# Patient Record
Sex: FEMALE | Race: WHITE | NOT HISPANIC OR LATINO | URBAN - METROPOLITAN AREA
[De-identification: names, ages, dates, MRNs, and addresses within clinical notes are randomized per-mention and may not be internally consistent; named-entity substitution may affect disease eponyms.]

---

## 2019-07-26 RX ORDER — LANOLIN ALCOHOL/MO/W.PET/CERES
1 CREAM (GRAM) TOPICAL DAILY
COMMUNITY

## 2019-07-26 RX ORDER — IBUPROFEN 200 MG
2 TABLET ORAL EVERY 6 HOURS PRN
COMMUNITY
End: 2019-07-29

## 2019-07-26 RX ORDER — MELOXICAM 15 MG/1
1 TABLET ORAL DAILY
COMMUNITY
End: 2019-07-29 | Stop reason: SDUPTHER

## 2019-07-29 ENCOUNTER — OFFICE VISIT (OUTPATIENT)
Dept: FAMILY MEDICINE CLINIC | Facility: CLINIC | Age: 54
End: 2019-07-29

## 2019-07-29 VITALS
DIASTOLIC BLOOD PRESSURE: 73 MMHG | HEART RATE: 70 BPM | SYSTOLIC BLOOD PRESSURE: 124 MMHG | OXYGEN SATURATION: 100 % | HEIGHT: 62 IN | TEMPERATURE: 98.7 F | WEIGHT: 120 LBS | BODY MASS INDEX: 22.08 KG/M2 | RESPIRATION RATE: 18 BRPM

## 2019-07-29 DIAGNOSIS — G89.29 CHRONIC BILATERAL LOW BACK PAIN WITHOUT SCIATICA: ICD-10-CM

## 2019-07-29 DIAGNOSIS — M54.50 CHRONIC BILATERAL LOW BACK PAIN WITHOUT SCIATICA: ICD-10-CM

## 2019-07-29 DIAGNOSIS — M25.562 ACUTE PAIN OF LEFT KNEE: Primary | ICD-10-CM

## 2019-07-29 DIAGNOSIS — Z98.890 HISTORY OF LUMBAR DISCECTOMY: ICD-10-CM

## 2019-07-29 DIAGNOSIS — M17.12 ARTHRITIS OF KNEE, LEFT: ICD-10-CM

## 2019-07-29 DIAGNOSIS — M51.36 DDD (DEGENERATIVE DISC DISEASE), LUMBAR: ICD-10-CM

## 2019-07-29 PROCEDURE — 99214 OFFICE O/P EST MOD 30 MIN: CPT | Performed by: FAMILY MEDICINE

## 2019-07-29 RX ORDER — MELOXICAM 15 MG/1
15 TABLET ORAL DAILY
Qty: 90 TABLET | Refills: 1 | Status: SHIPPED | OUTPATIENT
Start: 2019-07-29 | End: 2020-02-07 | Stop reason: SDUPTHER

## 2019-07-29 NOTE — PATIENT INSTRUCTIONS
Core Strength Exercises  Core exercises help to build strength in the muscles between your ribs and your hips (abdominal muscles). These muscles help to support your body and keep your spine stable. It is important to maintain strength in your core to prevent injury and pain.  Some activities, such as yoga and Pilates, can help to strengthen core muscles. You can also strengthen core muscles with exercises at home. It is important to talk to your health care provider before you start a new exercise routine.  What are the benefits of core strength exercises?  Core strength exercises can:  · Reduce back pain.  · Help to rebuild strength after a back or spine injury.  · Help to prevent injury during physical activity, especially injuries to the back and knees.    How to do core strength exercises  Repeat these exercises 10-15 times, or until you are tired. Do exercises exactly as told by your health care provider and adjust them as directed. It is normal to feel mild stretching, pulling, tightness, or discomfort as you do these exercises. If you feel any pain while doing these exercises, stop. If your pain continues or gets worse when doing core exercises, contact your health care provider.  You may want to use a padded yoga or exercise mat for strength exercises that are done on the floor.  Bridging  1. Lie on your back on a firm surface with your knees bent and your feet flat on the floor.  2. Raise your hips so that your knees, hips, and shoulders form a straight line together. Keep your abdominal muscles tight.  3. Hold this position for 3-5 seconds.  4. Slowly lower your hips to the starting position.  5. Let your muscles relax completely between repetitions.  Single-leg bridge  1. Lie on your back on a firm surface with your knees bent and your feet flat on the floor.  2. Raise your hips so that your knees, hips, and shoulders form a straight line together. Keep your abdominal muscles tight.  3. Lift one foot off  the floor, then completely straighten that leg.  4. Hold this position for 3-5 seconds.  5. Put the straight leg back down in the bent position.  6. Slowly lower your hips to the starting position.  7. Repeat these steps using your other leg.  Side bridge  1. Lie on your side with your knees bent. Prop yourself up on the elbow that is near the floor.  2. Using your abdominal muscles and your elbow that is on the floor, raise your body off the floor. Raise your hip so that your shoulder, hip, and foot form a straight line together.  3. Hold this position for 10 seconds. Keep your head and neck raised and away from your shoulder (in their normal, neutral position). Keep your abdominal muscles tight.  4. Slowly lower your hip to the starting position.  5. Repeat and try to hold this position longer, working your way up to 30 seconds.  Abdominal crunch  1. Lie on your back on a firm surface. Bend your knees and keep your feet flat on the floor.  2. Cross your arms over your chest.  3. Without bending your neck, tip your chin slightly toward your chest.  4. Tighten your abdominal muscles as you lift your chest just high enough to lift your shoulder blades off of the floor. Do not hold your breath. You can do this with short lifts or long lifts.  5. Slowly return to the starting position.  Bird dog  1. Get on your hands and knees, with your legs shoulder-width apart and your arms under your shoulders. Keep your back straight.  2. Tighten your abdominal muscles.  3. Raise one of your legs off the floor and straighten it. Try to keep it parallel to the floor.  4. Slowly lower your leg to the starting position.  5. Raise one of your arms off the floor and straighten it. Try to keep it parallel to the floor.  6. Slowly lower your arm to the starting position.  7. Repeat with the other arm and leg. If possible, try raising a leg and arm at the same time, on opposite sides of the body. For example, raise your left hand and your  right leg.  Plank  1. Lie on your belly.  2. Prop up your body onto your forearms and your feet, keeping your legs straight. Your body should make a straight line between your shoulders and feet.  3. Hold this position for 10 seconds while keeping your abdominal muscles tight.  4. Lower your body to the starting position.  5. Repeat and try to hold this position longer, working your way up to 30 seconds.  Cross-core strengthening  1. Stand with your feet shoulder-width apart.  2. Hold a ball out in front of you. Keep your arms straight.  3. Tighten your abdominal muscles and slowly rotate at your waist from side to side. Keep your feet flat.  4. Once you are comfortable, try repeating this exercise with a heavier ball.  Top core strengthening  1. Stand about 18 inches (46 cm) in front of a wall, with your back to the wall.  2. Keep your feet flat and shoulder-width apart.  3. Tighten your abdominal muscles.  4. Bend your hips and knees.  5. Slowly reach between your legs to touch the wall behind you.  6. Slowly stand back up.  7. Raise your arms over your head and reach behind you.  8. Return to the starting position.  General tips  · Do not do any exercises that cause pain. If you have pain while exercising, talk to your health care provider.  · Always stretch before and after doing these exercises. This can help prevent injury.  · Maintain a healthy weight. Ask your health care provider what weight is healthy for you.  Contact a health care provider if:  · You have back pain that gets worse or does not go away.  · You feel pain while doing core strength exercises.  Get help right away if:  · You have severe pain that does not get better with medicine.  Summary  · Core exercises help to build strength in the muscles between your ribs and your waist.  · Core muscles help to support your body and keep your spine stable.  · Some activities, such as yoga and Pilates, can help to strengthen core muscles.  · Core  strength exercises can help back pain and can prevent injury.  · If you feel any pain while doing core strength exercises, stop.  This information is not intended to replace advice given to you by your health care provider. Make sure you discuss any questions you have with your health care provider.  Document Released: 2018 Document Revised: 2018 Document Reviewed: 2018  [employer] Interactive Patient Education © 2019 [employer]    Back Exercises  The following exercises strengthen the muscles that help to support the back. They also help to keep the lower back flexible. Doing these exercises can help to prevent back pain or lessen existing pain.  If you have back pain or discomfort, try doing these exercises 2-3 times each day or as told by your health care provider. When the pain goes away, do them once each day, but increase the number of times that you repeat the steps for each exercise (do more repetitions). If you do not have back pain or discomfort, do these exercises once each day or as told by your health care provider.  Exercises  Single Knee to Chest  Repeat these steps 3-5 times for each le. Lie on your back on a firm bed or the floor with your legs extended.  2. Bring one knee to your chest. Your other leg should stay extended and in contact with the floor.  3. Hold your knee in place by grabbing your knee or thigh.  4. Pull on your knee until you feel a gentle stretch in your lower back.  5. Hold the stretch for 10-30 seconds.  6. Slowly release and straighten your leg.    Pelvic Tilt  Repeat these steps 5-10 times:  1. Lie on your back on a firm bed or the floor with your legs extended.  2. Bend your knees so they are pointing toward the ceiling and your feet are flat on the floor.  3. Tighten your lower abdominal muscles to press your lower back against the floor. This motion will tilt your pelvis so your tailbone points up toward the ceiling instead of pointing to your feet  or the floor.  4. With gentle tension and even breathing, hold this position for 5-10 seconds.    Cat-Cow    Repeat these steps until your lower back becomes more flexible:  1. Get into a hands-and-knees position on a firm surface. Keep your hands under your shoulders, and keep your knees under your hips. You may place padding under your knees for comfort.  2. Let your head hang down, and point your tailbone toward the floor so your lower back becomes rounded like the back of a cat.  3. Hold this position for 5 seconds.  4. Slowly lift your head and point your tailbone up toward the ceiling so your back forms a sagging arch like the back of a cow.  5. Hold this position for 5 seconds.    Press-Ups    Repeat these steps 5-10 times:  1. Lie on your abdomen (face-down) on the floor.  2. Place your palms near your head, about shoulder-width apart.  3. While you keep your back as relaxed as possible and keep your hips on the floor, slowly straighten your arms to raise the top half of your body and lift your shoulders. Do not use your back muscles to raise your upper torso. You may adjust the placement of your hands to make yourself more comfortable.  4. Hold this position for 5 seconds while you keep your back relaxed.  5. Slowly return to lying flat on the floor.    Bridges    Repeat these steps 10 times:  1. Lie on your back on a firm surface.  2. Bend your knees so they are pointing toward the ceiling and your feet are flat on the floor.  3. Tighten your buttocks muscles and lift your buttocks off of the floor until your waist is at almost the same height as your knees. You should feel the muscles working in your buttocks and the back of your thighs. If you do not feel these muscles, slide your feet 1-2 inches farther away from your buttocks.  4. Hold this position for 3-5 seconds.  5. Slowly lower your hips to the starting position, and allow your buttocks muscles to relax completely.    If this exercise is too easy,  try doing it with your arms crossed over your chest.  Abdominal Crunches  Repeat these steps 5-10 times:  1. Lie on your back on a firm bed or the floor with your legs extended.  2. Bend your knees so they are pointing toward the ceiling and your feet are flat on the floor.  3. Cross your arms over your chest.  4. Tip your chin slightly toward your chest without bending your neck.  5. Tighten your abdominal muscles and slowly raise your trunk (torso) high enough to lift your shoulder blades a tiny bit off of the floor. Avoid raising your torso higher than that, because it can put too much stress on your low back and it does not help to strengthen your abdominal muscles.  6. Slowly return to your starting position.    Back Lifts  Repeat these steps 5-10 times:  1. Lie on your abdomen (face-down) with your arms at your sides, and rest your forehead on the floor.  2. Tighten the muscles in your legs and your buttocks.  3. Slowly lift your chest off of the floor while you keep your hips pressed to the floor. Keep the back of your head in line with the curve in your back. Your eyes should be looking at the floor.  4. Hold this position for 3-5 seconds.  5. Slowly return to your starting position.    Contact a health care provider if:  · Your back pain or discomfort gets much worse when you do an exercise.  · Your back pain or discomfort does not lessen within 2 hours after you exercise.  If you have any of these problems, stop doing these exercises right away. Do not do them again unless your health care provider says that you can.  Get help right away if:  · You develop sudden, severe back pain. If this happens, stop doing the exercises right away. Do not do them again unless your health care provider says that you can.  This information is not intended to replace advice given to you by your health care provider. Make sure you discuss any questions you have with your health care provider.  Document Released: 01/25/2006  Document Revised: 07/31/2018 Document Reviewed: 02/11/2016  [email or web address] Interactive Patient Education © 2019 [employer]
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
## 2020-02-07 DIAGNOSIS — G89.29 CHRONIC BILATERAL LOW BACK PAIN WITHOUT SCIATICA: ICD-10-CM

## 2020-02-07 DIAGNOSIS — M54.50 CHRONIC BILATERAL LOW BACK PAIN WITHOUT SCIATICA: ICD-10-CM

## 2020-02-07 DIAGNOSIS — M51.36 DDD (DEGENERATIVE DISC DISEASE), LUMBAR: ICD-10-CM

## 2020-02-07 RX ORDER — MELOXICAM 15 MG/1
15 TABLET ORAL DAILY
Qty: 90 TABLET | Refills: 1 | Status: SHIPPED | OUTPATIENT
Start: 2020-02-07 | End: 2020-07-20 | Stop reason: SDUPTHER

## 2020-02-07 NOTE — TELEPHONE ENCOUNTER
TC from patient requesting refill be sent to [facility] for the following med;    Meloxicam 15mg  #90

## 2020-07-20 DIAGNOSIS — M54.50 CHRONIC BILATERAL LOW BACK PAIN WITHOUT SCIATICA: ICD-10-CM

## 2020-07-20 DIAGNOSIS — M51.36 DDD (DEGENERATIVE DISC DISEASE), LUMBAR: ICD-10-CM

## 2020-07-20 DIAGNOSIS — G89.29 CHRONIC BILATERAL LOW BACK PAIN WITHOUT SCIATICA: ICD-10-CM

## 2020-07-20 RX ORDER — MELOXICAM 15 MG/1
15 TABLET ORAL DAILY
Qty: 90 TABLET | Refills: 1 | Status: SHIPPED | OUTPATIENT
Start: 2020-07-20 | End: 2020-11-23 | Stop reason: SDUPTHER

## 2020-11-23 ENCOUNTER — OFFICE VISIT (OUTPATIENT)
Dept: FAMILY MEDICINE CLINIC | Facility: CLINIC | Age: 55
End: 2020-11-23

## 2020-11-23 VITALS
OXYGEN SATURATION: 97 % | SYSTOLIC BLOOD PRESSURE: 139 MMHG | TEMPERATURE: 97.1 F | HEART RATE: 86 BPM | WEIGHT: 120 LBS | BODY MASS INDEX: 20.49 KG/M2 | DIASTOLIC BLOOD PRESSURE: 89 MMHG | HEIGHT: 64 IN

## 2020-11-23 DIAGNOSIS — M54.50 CHRONIC BILATERAL LOW BACK PAIN WITHOUT SCIATICA: ICD-10-CM

## 2020-11-23 DIAGNOSIS — L29.0 PRURITUS ANI: Primary | ICD-10-CM

## 2020-11-23 DIAGNOSIS — M51.36 DDD (DEGENERATIVE DISC DISEASE), LUMBAR: ICD-10-CM

## 2020-11-23 DIAGNOSIS — G89.29 CHRONIC BILATERAL LOW BACK PAIN WITHOUT SCIATICA: ICD-10-CM

## 2020-11-23 DIAGNOSIS — R19.5 HEME POSITIVE STOOL: ICD-10-CM

## 2020-11-23 LAB
DEVELOPER EXPIRATION DATE: ABNORMAL
DEVELOPER LOT NUMBER: ABNORMAL
EXPIRATION DATE: ABNORMAL
FECAL OCCULT BLOOD SCREEN, POC: POSITIVE
Lab: ABNORMAL
NEGATIVE CONTROL: NEGATIVE
POSITIVE CONTROL: POSITIVE

## 2020-11-23 PROCEDURE — 99214 OFFICE O/P EST MOD 30 MIN: CPT | Performed by: FAMILY MEDICINE

## 2020-11-23 PROCEDURE — 82270 OCCULT BLOOD FECES: CPT | Performed by: FAMILY MEDICINE

## 2020-11-23 RX ORDER — HYDROCORTISONE 25 MG/G
CREAM TOPICAL 2 TIMES DAILY
Qty: 28 G | Refills: 3 | Status: SHIPPED | OUTPATIENT
Start: 2020-11-23 | End: 2022-03-08

## 2020-11-23 RX ORDER — MELOXICAM 15 MG/1
15 TABLET ORAL DAILY
Qty: 90 TABLET | Refills: 3 | Status: SHIPPED | OUTPATIENT
Start: 2020-11-23 | End: 2021-10-21 | Stop reason: SDUPTHER

## 2020-11-23 NOTE — PROGRESS NOTES
No chief complaint on file.      Subjective     Juanita Chacon is a 55 y.o. female.  Patient here today complaining of having itching in her butt, states this has been an on going issue for years, patient has tried different things, like changing her under wear, different body washes, has been given cream through urgent care at [facility], treated with anihelmethic, have tried antifungals and cortisone creams, nothing helps for long, comes and goes.     Had mammogram and pap about 5 years ago. Had to follow with additonal mammograms huge cost.  Have not had another mammogram since.    Patient is requesting refill of meloxicam for chronic back pain.    Patient was concerned about elevated blood pressure, states she does check occasionally at home and has never had readings anywhere near as high as today.    The following portions of the patient's history were reviewed and updated as appropriate: allergies, current medications, past family history, past medical history, past social history, past surgical history and problem list.    Current Outpatient Medications on File Prior to Visit   Medication Sig   • Garlic 500 MG capsule Take 1 capsule by mouth Daily.   • Multiple Vitamins-Minerals (CENTRUM SILVER 50+WOMEN PO) Take 1 tablet by mouth Daily.   • VITAMIN B COMPLEX-C PO Take 1 capsule by mouth Daily.   • [DISCONTINUED] meloxicam (MOBIC) 15 MG tablet Take 1 tablet by mouth Daily.     No current facility-administered medications on file prior to visit.      Medications Discontinued During This Encounter   Medication Reason   • meloxicam (MOBIC) 15 MG tablet Reorder       Review of Systems   Constitutional: Positive for diaphoresis. Negative for fatigue and fever.   Respiratory: Negative for cough and shortness of breath.    Cardiovascular: Negative for chest pain, palpitations and leg swelling.   Gastrointestinal: Negative.    Endocrine: Positive for heat intolerance.   Genitourinary: Positive for menstrual  "problem ( Approximately 3 periods a year) and vaginal discharge.   Musculoskeletal: Positive for back pain. Negative for joint swelling.   Skin: Negative for rash.        Pruritus   Psychiatric/Behavioral: Negative for depressed mood.        Objective   Vitals:    11/23/20 1523 11/23/20 1531   BP: 146/95 139/89   BP Location: Right arm Right arm   Patient Position: Sitting Sitting   Cuff Size: Adult Adult   Pulse: 86    Temp: 97.1 °F (36.2 °C)    TempSrc: Infrared    SpO2: 97%    Weight: 54.4 kg (120 lb)    Height: 162.6 cm (64\")       Body mass index is 20.6 kg/m².    Physical Exam  Vitals signs and nursing note reviewed.   Constitutional:       General: She is not in acute distress.     Appearance: Normal appearance. She is well-developed.   HENT:      Head: Normocephalic and atraumatic.   Cardiovascular:      Rate and Rhythm: Normal rate and regular rhythm.      Heart sounds: No murmur.   Pulmonary:      Effort: Pulmonary effort is normal.      Breath sounds: Normal breath sounds. No wheezing.   Genitourinary:     Rectum: Normal. Guaiac result positive.      Comments: Sheryl anal area with slightly hypertrophic minimally hyperpigmented skin and then between 1 and 2 cm circumferentially, there is a subcentimeter somewhat lacy minimally raised patch at the 10 to 11 o'clock position.  There is no evidence of skin fissures, external or internal hemorrhoids.  Anoscope was not available for better visualization  Musculoskeletal: Normal range of motion.   Skin:     General: Skin is warm and dry.      Findings: No rash.   Neurological:      Mental Status: She is alert and oriented to person, place, and time.             No results found for: HGBA1C     Procedures     Assessment/Plan   Diagnoses and all orders for this visit:    1. Pruritus ani (Primary)  -     Hydrocortisone, Perianal, (ANUSOL-HC) 2.5 % rectal cream; Insert  into the rectum 2 (Two) Times a Day.  Dispense: 28 g; Refill: 3    2. Chronic bilateral low back " pain without sciatica  -     meloxicam (MOBIC) 15 MG tablet; Take 1 tablet by mouth Daily.  Dispense: 90 tablet; Refill: 3    3. DDD (degenerative disc disease), lumbar  -     meloxicam (MOBIC) 15 MG tablet; Take 1 tablet by mouth Daily.  Dispense: 90 tablet; Refill: 3    4. Heme positive stool  -     POCT occult blood x 1 stool    Pruritus ani with pain hem positivity, prescription for hydrocortisone and  referral for GI consultation and colonoscopy will have patient return for gynecologic exam and Pap and further evaluation of dermatologic changes possibly consistent with lichen sclerosis in the perianal/perineal area.  Elevated blood pressure without history of hypertension, recheck in prehypertensive range, likely whitecoat hypertension  Refill meloxicam,  Medications Discontinued During This Encounter   Medication Reason   • meloxicam (MOBIC) 15 MG tablet Reorder          Return in about 4 weeks (around 12/21/2020) for wellness with pap and follow up on puritis ani and recheck blood pressure.    Education reference material relevant to visit available in AVS through [facility] or printed copy given.

## 2020-11-23 NOTE — PATIENT INSTRUCTIONS
Anal Pruritus  Anal pruritus is an itchy feeling in the anus and on the skin around the anus. This is common and can be caused by many things. It often occurs when the area becomes moist. Moisture may be due to sweating or to a small amount of stool (feces) that is left on the area because of poor personal cleaning. Some other causes include:  · Things that can irritate your skin, such as:  ? Perfumed soaps and sprays.  ? Colored or scented toilet paper.  ? Excessive washing.  · Certain foods, such as caffeine, beer, and spicy foods.  · Diarrhea or loose stool.  · Skin disorders (psoriasis, eczema, or seborrhea).  · Hemorrhoids, fissures, infections, and other anal diseases.  · Other medical conditions, such as diabetes, thyroid problems, STIs (sexually transmitted infections), or some cancers.  In many cases, the cause is not known. The itching usually goes away with treatment and home care. Scratching can cause further skin damage and make the problem worse.  Follow these instructions at home:  Skin care         · Practice good hygiene.  ? Clean the anal area gently with wet toilet paper or a wet washcloth after every bowel movement and at bedtime.  ? Avoid using soaps on the anal area.  ? Dry the area thoroughly. Pat the area dry with toilet paper or a towel.  · Do not scrub the anal area with anything, including toilet paper.  · Do not scratch the itchy area. Scratching causes more damage and makes the itching worse.  · Take sitz baths as told by your health care provider.  ? A sitz bath is a warm water bath that only comes up to your hips and covers your buttocks. A sitz bath may be done at home in a bathtub or with a portable sitz bath that fits over the toilet.  ? Pat the area dry with a soft cloth after each bath.  · Use creams or ointments as told by your health care provider. Zinc oxide ointment or a moisture barrier cream can be applied several times a day to protect and heal the skin.  · Do not use  anything that irritates the skin, such as bubble baths, scented toilet paper, or genital deodorants.  General instructions  · Pay attention to any changes in your symptoms.  · Take or apply over-the-counter and prescription medicines only as told by your health care provider.  · Avoid overusing medicines that help you have a bowel movement (laxatives). These can cause you to have loose stools.  · Talk with your health care provider about whether you should increase the fiber in your diet. This can help keep your stool normal if you have frequent loose stools.  · Limit or avoid foods that may cause your symptoms. These may include:  ? Spicy foods, such as salsa, jalapeño peppers, and spicy seasonings.  ? Caffeine or beer.  ? Milk products.  ? Chocolate, nuts, citrus fruits, or tomatoes.  · Wear cotton underwear and loose clothing.  · Keep all follow-up visits as told by your health care provider. This is important.  Contact a health care provider if:  · Your itching does not improve in several days.  · Your itching gets worse.  · You have a fever.  · You have redness, swelling, or pain in the anal area.  · You have fluid, blood, or pus coming from the anal area.  Summary  · Anal pruritus is an itchy feeling in the anus and the skin in the anal area. This can be caused by many things, such as things that irritate your skin and certain medical conditions.  · Take or apply over-the-counter and prescription medicines only as told by your health care provider.  · Practice good hygiene as told by your health care provider.  · Talk with your health care provider about fiber supplements. These are helpful in keeping your stool normal if you have frequent loose stools.  · Contact a health care provider if your symptoms get worse or if you develop new symptoms.  This information is not intended to replace advice given to you by your health care provider. Make sure you discuss any questions you have with your health care  provider.  Document Released: 06/18/2012 Document Revised: 04/29/2019 Document Reviewed: 04/29/2019  [employer]vier Patient Education © 2020 [employer]    Lichen Sclerosus  Lichen sclerosus is a skin problem. It can happen on any part of the body, but it commonly involves the anal or genital areas. It can cause itching and discomfort in these areas. Treatment can help to control symptoms. When the genital area is affected, getting treatment is important because the condition can cause scarring that may lead to other problems.  What are the causes?  The cause of this condition is not known. It may be related to an overactive immune system or a lack of certain hormones. Lichen sclerosus is not an infection or a fungus, and it is not passed from one person to another (not contagious).  What increases the risk?  This condition is more likely to develop in women, usually after menopause.  What are the signs or symptoms?  Symptoms of this condition include:  · Thin, wrinkled, white areas on the skin.  · Thickened white areas on the skin.  · Red and swollen patches (lesions) on the skin.  · Tears or cracks in the skin.  · Bruising.  · Blood blisters.  · Severe itching.  · Pain, itching, or burning when urinating.  Constipation is also common in people with lichen sclerosus.  How is this diagnosed?  This condition may be diagnosed with a physical exam. In some cases, a tissue sample (biopsy sample) may be removed to be looked at under a microscope.  How is this treated?  This condition is usually treated with medicated creams or ointments (topical steroids) that are applied over the affected areas. In some cases, treatment may also include medicines that are taken by mouth. Surgery may be needed in more severe cases that are causing problems such as scarring.  Follow these instructions at home:  · Take or use over-the-counter and prescription medicines only as told by your health care provider.  · Use creams or ointments as  told by your health care provider.  · Do not scratch the affected areas of skin.  · If you are a woman, be sure to keep the vaginal area as clean and dry as possible.  · Clean the affected area of skin gently with water. Avoid using rough towels or toilet paper.  · Keep all follow-up visits as told by your health care provider. This is important.  Contact a health care provider if:  · You have increasing redness, swelling, or pain in the affected area.  · You have fluid, blood, or pus coming from the affected area.  · You have new lesions on your skin.  · You have a fever.  · You have pain during sex.  Summary  · Lichen sclerosus is a skin problem. When the genital area is affected, getting treatment is important because the condition can cause scarring that may lead to other problems.  · This condition is usually treated with medicated creams or ointments (topical steroids) that are applied over the affected areas.  · Take or use over-the-counter and prescription medicines only as told by your health care provider.  · Contact a health care provider if you have new lesions on your skin, have pain during sex, or have increasing redness, swelling, or pain in the affected area.  · Keep all follow-up visits as told by your health care provider. This is important.  This information is not intended to replace advice given to you by your health care provider. Make sure you discuss any questions you have with your health care provider.  Document Released: 05/09/2012 Document Revised: 05/02/2019 Document Reviewed: 05/02/2019  [employer] Patient Education © 2020 [employer]

## 2021-01-29 ENCOUNTER — OFFICE VISIT (OUTPATIENT)
Dept: FAMILY MEDICINE CLINIC | Facility: CLINIC | Age: 56
End: 2021-01-29

## 2021-01-29 VITALS
BODY MASS INDEX: 21 KG/M2 | SYSTOLIC BLOOD PRESSURE: 133 MMHG | DIASTOLIC BLOOD PRESSURE: 87 MMHG | WEIGHT: 123 LBS | HEART RATE: 83 BPM | HEIGHT: 64 IN | OXYGEN SATURATION: 99 % | TEMPERATURE: 98 F

## 2021-01-29 DIAGNOSIS — Z00.00 ANNUAL PHYSICAL EXAM: Primary | ICD-10-CM

## 2021-01-29 DIAGNOSIS — L29.0 PRURITUS ANI: ICD-10-CM

## 2021-01-29 DIAGNOSIS — Z98.890 HISTORY OF LUMBAR DISCECTOMY: ICD-10-CM

## 2021-01-29 DIAGNOSIS — R03.0 PREHYPERTENSION: ICD-10-CM

## 2021-01-29 DIAGNOSIS — Z12.11 SCREENING FOR COLON CANCER: ICD-10-CM

## 2021-01-29 DIAGNOSIS — N95.1 PERIMENOPAUSE: ICD-10-CM

## 2021-01-29 PROCEDURE — 99396 PREV VISIT EST AGE 40-64: CPT | Performed by: FAMILY MEDICINE

## 2021-01-29 PROCEDURE — 99213 OFFICE O/P EST LOW 20 MIN: CPT | Performed by: FAMILY MEDICINE

## 2021-01-29 NOTE — PROGRESS NOTES
"Chief Complaint   Patient presents with   • Gynecologic Exam       Subjective     Juanita Chacon is a 55 y.o. female. Patient here for annual wellness with pap   [facility], [facility], BI-RADS 4,  she did have a biopsy on April 8, 2015 for a 16 x 6 x 10 mm mass in upper outer quadrant of the right breast pathology revealed fibrous mastopathy and focal fibroadenoma toyed change negative for ductal carcinoma recommendation for 6-month diagnostic right mammogram and right breast ultrasound which patient did not follow through with, due to feeling \"the process was a scam\".  cought with an unexpected $1000 medical bill. SBE do not notice any lumps or bumps, no changes.   Pap smear 03/12/2015 negative.  Hot flashes had 2-3 periods last year, last about 6 months ago.   Colonoscopy- never. No family history of colon cancer, hesitant of procedure and prep. Worried would cause vomiting.  Would do cologuard  dexa scan- never  Do smoke cigarettes but only 2-4 cigarettes a day, somedays none. Quit for 6 months couple years ago but restarted, do enjoy. Partner not interested in cigarette use.     Additionally she was seen in November for pruritus ani and prescribed hydrocortisone and she is here to follow-up on elevated blood pressure without diagnosis of hypertension.    Stopped using pads and started using an Aloe salve and seems to have resolved. Still leak urine when laugh. Cortisone did help but did not need to use much.     The following portions of the patient's history were reviewed and updated as appropriate: allergies, current medications, past family history, past medical history, past social history, past surgical history and problem list.    Current Outpatient Medications on File Prior to Visit   Medication Sig   • Garlic 500 MG capsule Take 1 capsule by mouth Daily.   • Hydrocortisone, Perianal, (ANUSOL-HC) 2.5 % rectal cream Insert  into the rectum 2 (Two) Times a Day.   • meloxicam " "(MOBIC) 15 MG tablet Take 1 tablet by mouth Daily.   • Multiple Vitamins-Minerals (CENTRUM SILVER 50+WOMEN PO) Take 1 tablet by mouth Daily.   • VITAMIN B COMPLEX-C PO Take 1 capsule by mouth Daily.     No current facility-administered medications on file prior to visit.      There are no discontinued medications.    Review of Systems   Constitutional: Negative for fatigue and fever.   Respiratory: Negative for cough and shortness of breath.    Cardiovascular: Negative for chest pain, palpitations and leg swelling.   Gastrointestinal: Negative.    Endocrine: Positive for heat intolerance.   Genitourinary: Positive for amenorrhea, urinary incontinence and vaginal discharge. Pelvic pain:     Musculoskeletal: Positive for back pain. Negative for joint swelling.   Skin: Negative for rash.        Previous pruritus has currently resolved   Psychiatric/Behavioral: Negative for depressed mood.        Objective   Vitals:    01/29/21 0858   BP: 133/87   BP Location: Left arm   Patient Position: Sitting   Cuff Size: Adult   Pulse: 83   Temp: 98 °F (36.7 °C)   TempSrc: Infrared   SpO2: 99%   Weight: 55.8 kg (123 lb)   Height: 162.6 cm (64\")      Body mass index is 21.11 kg/m².    Physical Exam  Vitals signs and nursing note reviewed.   Constitutional:       General: She is not in acute distress.     Appearance: She is well-developed.   HENT:      Head: Normocephalic and atraumatic.      Right Ear: External ear normal.      Left Ear: External ear normal.      Nose: Nose normal.   Eyes:      General: No scleral icterus.        Right eye: No discharge.         Left eye: No discharge.      Conjunctiva/sclera: Conjunctivae normal.      Pupils: Pupils are equal, round, and reactive to light.   Neck:      Musculoskeletal: Normal range of motion.   Cardiovascular:      Rate and Rhythm: Normal rate and regular rhythm.      Heart sounds: No murmur.   Pulmonary:      Effort: Pulmonary effort is normal.      Breath sounds: No wheezing.     "  Comments: Breast exam with nontender fibrocystic changes without suspicious masses or lesions  Abdominal:      General: Bowel sounds are normal. There is no distension.      Palpations: Abdomen is soft. There is no mass.      Tenderness: There is no abdominal tenderness.      Hernia: No hernia is present.   Genitourinary:     Vagina: Normal.      Adnexa:         Right: No tenderness.          Left: No tenderness.        Comments: Redundant labia, vaginal mucosa within normal limits limits, minimal cervical mucus, pap obtained.  Perirectal area without rash, erythema, or lesion.  Musculoskeletal: Normal range of motion.   Lymphadenopathy:      Cervical: No cervical adenopathy.   Skin:     General: Skin is warm and dry.   Neurological:      Mental Status: She is alert and oriented to person, place, and time.             No results found for: HGBA1C     Procedures     Assessment/Plan   Diagnoses and all orders for this visit:    1. Annual physical exam (Primary)  -     Pap IG (Image Guided)    2. Perimenopause    3. Screening for colon cancer  -     Cologuard - Stool, Per Rectum; Future    4. Pruritus ani    5. History of lumbar discectomy    6. Prehypertension      Discussed preventative health issues, patient declines mammogram and DEXA scan as well as any additional immunizations at this time, agrees to Cologuard and did Pap today    There are no discontinued medications.       Return in about 1 year (around 1/29/2022) for Annual physical or as needed any concerns..    Education reference material relevant to visit available in AVS through [employer] or printed copy given.

## 2021-01-29 NOTE — PATIENT INSTRUCTIONS
"DASH Eating Plan  DASH stands for \"Dietary Approaches to Stop Hypertension.\" The DASH eating plan is a healthy eating plan that has been shown to reduce high blood pressure (hypertension). It may also reduce your risk for type 2 diabetes, heart disease, and stroke. The DASH eating plan may also help with weight loss.  What are tips for following this plan?    General guidelines  · Avoid eating more than 2,300 mg (milligrams) of salt (sodium) a day. If you have hypertension, you may need to reduce your sodium intake to 1,500 mg a day.  · Limit alcohol intake to no more than 1 drink a day for nonpregnant women and 2 drinks a day for men. One drink equals 12 oz of beer, 5 oz of wine, or 1½ oz of hard liquor.  · Work with your health care provider to maintain a healthy body weight or to lose weight. Ask what an ideal weight is for you.  · Get at least 30 minutes of exercise that causes your heart to beat faster (aerobic exercise) most days of the week. Activities may include walking, swimming, or biking.  · Work with your health care provider or diet and nutrition specialist (dietitian) to adjust your eating plan to your individual calorie needs.  Reading food labels    · Check food labels for the amount of sodium per serving. Choose foods with less than 5 percent of the Daily Value of sodium. Generally, foods with less than 300 mg of sodium per serving fit into this eating plan.  · To find whole grains, look for the word \"whole\" as the first word in the ingredient list.  Shopping  · Buy products labeled as \"low-sodium\" or \"no salt added.\"  · Buy fresh foods. Avoid canned foods and premade or frozen meals.  Cooking  · Avoid adding salt when cooking. Use salt-free seasonings or herbs instead of table salt or sea salt. Check with your health care provider or pharmacist before using salt substitutes.  · Do not dickens foods. Cook foods using healthy methods such as baking, boiling, grilling, and broiling instead.  · Cook with " heart-healthy oils, such as olive, canola, soybean, or sunflower oil.  Meal planning  · Eat a balanced diet that includes:  ? 5 or more servings of fruits and vegetables each day. At each meal, try to fill half of your plate with fruits and vegetables.  ? Up to 6-8 servings of whole grains each day.  ? Less than 6 oz of lean meat, poultry, or fish each day. A 3-oz serving of meat is about the same size as a deck of cards. One egg equals 1 oz.  ? 2 servings of low-fat dairy each day.  ? A serving of nuts, seeds, or beans 5 times each week.  ? Heart-healthy fats. Healthy fats called Omega-3 fatty acids are found in foods such as flaxseeds and coldwater fish, like sardines, salmon, and mackerel.  · Limit how much you eat of the following:  ? Canned or prepackaged foods.  ? Food that is high in trans fat, such as fried foods.  ? Food that is high in saturated fat, such as fatty meat.  ? Sweets, desserts, sugary drinks, and other foods with added sugar.  ? Full-fat dairy products.  · Do not salt foods before eating.  · Try to eat at least 2 vegetarian meals each week.  · Eat more home-cooked food and less restaurant, buffet, and fast food.  · When eating at a restaurant, ask that your food be prepared with less salt or no salt, if possible.  What foods are recommended?  The items listed may not be a complete list. Talk with your dietitian about what dietary choices are best for you.  Grains  Whole-grain or whole-wheat bread. Whole-grain or whole-wheat pasta. Brown rice. Oatmeal. Quinoa. Bulgur. Whole-grain and low-sodium cereals. [place]ua bread. Low-fat, low-sodium crackers. Whole-wheat flour tortillas.  Vegetables  Fresh or frozen vegetables (raw, steamed, roasted, or grilled). Low-sodium or reduced-sodium tomato and vegetable juice. Low-sodium or reduced-sodium tomato sauce and tomato paste. Low-sodium or reduced-sodium canned vegetables.  Fruits  All fresh, dried, or frozen fruit. Canned fruit in natural juice (without  added sugar).  Meat and other protein foods  Skinless chicken or turkey. Ground chicken or turkey. Pork with fat trimmed off. Fish and seafood. Egg whites. Dried beans, peas, or lentils. Unsalted nuts, nut butters, and seeds. Unsalted canned beans. Lean cuts of beef with fat trimmed off. Low-sodium, lean deli meat.  Dairy  Low-fat (1%) or fat-free (skim) milk. Fat-free, low-fat, or reduced-fat cheeses. Nonfat, low-sodium ricotta or cottage cheese. Low-fat or nonfat yogurt. Low-fat, low-sodium cheese.  Fats and oils  Soft margarine without trans fats. Vegetable oil. Low-fat, reduced-fat, or light mayonnaise and salad dressings (reduced-sodium). Canola, safflower, olive, soybean, and sunflower oils. Avocado.  Seasoning and other foods  Herbs. Spices. Seasoning mixes without salt. Unsalted popcorn and pretzels. Fat-free sweets.  What foods are not recommended?  The items listed may not be a complete list. Talk with your dietitian about what dietary choices are best for you.  Grains  Baked goods made with fat, such as croissants, muffins, or some breads. Dry pasta or rice meal packs.  Vegetables  Creamed or fried vegetables. Vegetables in a cheese sauce. Regular canned vegetables (not low-sodium or reduced-sodium). Regular canned tomato sauce and paste (not low-sodium or reduced-sodium). Regular tomato and vegetable juice (not low-sodium or reduced-sodium). Pickles. Olives.  Fruits  Canned fruit in a light or heavy syrup. Fried fruit. Fruit in cream or butter sauce.  Meat and other protein foods  Fatty cuts of meat. Ribs. Fried meat. Casanova. Sausage. Bologna and other processed lunch meats. Salami. Fatback. Hotdogs. Bratwurst. Salted nuts and seeds. Canned beans with added salt. Canned or smoked fish. Whole eggs or egg yolks. Chicken or turkey with skin.  Dairy  Whole or 2% milk, cream, and half-and-half. Whole or full-fat cream cheese. Whole-fat or sweetened yogurt. Full-fat cheese. Nondairy creamers. Whipped toppings.  Processed cheese and cheese spreads.  Fats and oils  Butter. Stick margarine. Lard. Shortening. Ghee. Casanova fat. Tropical oils, such as coconut, palm kernel, or palm oil.  Seasoning and other foods  Salted popcorn and pretzels. Onion salt, garlic salt, seasoned salt, table salt, and sea salt. Worcestershire sauce. Tartar sauce. Barbecue sauce. Teriyaki sauce. Soy sauce, including reduced-sodium. Steak sauce. Canned and packaged gravies. Fish sauce. Oyster sauce. Cocktail sauce. Horseradish that you find on the shelf. Ketchup. Mustard. Meat flavorings and tenderizers. Bouillon cubes. Hot sauce and Tabasco sauce. Premade or packaged marinades. Premade or packaged taco seasonings. Relishes. Regular salad dressings.  Where to find more information:  · National Heart, Lung, and Blood [place]: www.nhlbi.nih.gov  · American Heart Association: www.heart.org  Summary  · The DASH eating plan is a healthy eating plan that has been shown to reduce high blood pressure (hypertension). It may also reduce your risk for type 2 diabetes, heart disease, and stroke.  · With the DASH eating plan, you should limit salt (sodium) intake to 2,300 mg a day. If you have hypertension, you may need to reduce your sodium intake to 1,500 mg a day.  · When on the DASH eating plan, aim to eat more fresh fruits and vegetables, whole grains, lean proteins, low-fat dairy, and heart-healthy fats.  · Work with your health care provider or diet and nutrition specialist (dietitian) to adjust your eating plan to your individual calorie needs.  This information is not intended to replace advice given to you by your health care provider. Make sure you discuss any questions you have with your health care provider.  Document Revised: 11/30/2018 Document Reviewed: 12/11/2017  Elsevier Patient Education © 2020 [employer].    Preventing Hypertension  Hypertension, commonly called high blood pressure, is when the force of blood pumping through the arteries is  too strong. Arteries are blood vessels that carry blood from the heart throughout the body. Over time, hypertension can damage the arteries and decrease blood flow to important parts of the body, including the brain, heart, and kidneys. Often, hypertension does not cause symptoms until blood pressure is very high. For this reason, it is important to have your blood pressure checked on a regular basis.  Hypertension can often be prevented with diet and lifestyle changes. If you already have hypertension, you can control it with diet and lifestyle changes, as well as medicine.  What nutrition changes can be made?  Maintain a healthy diet. This includes:  · Eating less salt (sodium). Ask your health care provider how much sodium is safe for you to have. The general recommendation is to consume less than 1 tsp (2,300 mg) of sodium a day.  ? Do not add salt to your food.  ? Choose low-sodium options when grocery shopping and eating out.  · Limiting fats in your diet. You can do this by eating low-fat or fat-free dairy products and by eating less red meat.  · Eating more fruits, vegetables, and whole grains. Make a goal to eat:  ? 1½-2 cups of fresh fruits and vegetables each day.  ? 3-4 servings of whole grains each day.  · Avoiding foods and beverages that have added sugars.  · Eating fish that contain healthy fats (omega-3 fatty acids), such as mackerel or salmon.  If you need help putting together a healthy eating plan, try the DASH diet. This diet is high in fruits, vegetables, and whole grains. It is low in sodium, red meat, and added sugars. DASH stands for Dietary Approaches to Stop Hypertension.  What lifestyle changes can be made?    · Lose weight if you are overweight. Losing just 3?5% of your body weight can help prevent or control hypertension.  ? For example, if your present weight is 200 lb (91 kg), a loss of 3-5% of your weight means losing 6-10 lb (2.7-4.5 kg).  ? Ask your health care provider to help you  with a diet and exercise plan to safely lose weight.  · Get enough exercise. Do at least 150 minutes of moderate-intensity exercise each week.  ? You could do this in short exercise sessions several times a day, or you could do longer exercise sessions a few times a week. For example, you could take a brisk 10-minute walk or bike ride, 3 times a day, for 5 days a week.  · Find ways to reduce stress, such as exercising, meditating, listening to music, or taking a yoga class. If you need help reducing stress, ask your health care provider.  · Do not smoke. This includes e-cigarettes. Chemicals in tobacco and nicotine products raise your blood pressure each time you smoke. If you need help quitting, ask your health care provider.  · Avoid alcohol. If you drink alcohol, limit alcohol intake to no more than 1 drink a day for nonpregnant women and 2 drinks a day for men. One drink equals 12 oz of beer, 5 oz of wine, or 1½ oz of hard liquor.  Why are these changes important?  Diet and lifestyle changes can help you prevent hypertension, and they may make you feel better overall and improve your quality of life. If you have hypertension, making these changes will help you control it and help prevent major complications, such as:  · Hardening and narrowing of arteries that supply blood to:  ? Your heart. This can cause a heart attack.  ? Your brain. This can cause a stroke.  ? Your kidneys. This can cause kidney failure.  · Stress on your heart muscle, which can cause heart failure.  What can I do to lower my risk?  · Work with your health care provider to make a hypertension prevention plan that works for you. Follow your plan and keep all follow-up visits as told by your health care provider.  · Learn how to check your blood pressure at home. Make sure that you know your personal target blood pressure, as told by your health care provider.  How is this treated?  In addition to diet and lifestyle changes, your health care  provider may recommend medicines to help lower your blood pressure. You may need to try a few different medicines to find what works best for you. You also may need to take more than one medicine. Take over-the-counter and prescription medicines only as told by your health care provider.  Where to find support  Your health care provider can help you prevent hypertension and help you keep your blood pressure at a healthy level. Your local hospital or your community may also provide support services and prevention programs.  The American Heart Association offers an online support network at: http://supportnetwork.heart.org/high-blood-pressure  Where to find more information  Learn more about hypertension from:  · National Heart, Lung, and Blood [place]: www.nhlbi.nih.gov/health/health-topics/topics/hbp  · Centers for Disease Control and Prevention: www.cdc.gov/bloodpressure  · American Academy of Family Physicians: http://familydoctor.org/familydoctor/en/diseases-conditions/high-blood-pressure.printerview.all.html  Learn more about the DASH diet from:  · National Heart, Lung, and Blood [place]: www.nhlbi.nih.gov/health/health-topics/topics/dash  Contact a health care provider if:  · You think you are having a reaction to medicines you have taken.  · You have recurrent headaches or feel dizzy.  · You have swelling in your ankles.  · You have trouble with your vision.  Summary  · Hypertension often does not cause any symptoms until blood pressure is very high. It is important to get your blood pressure checked regularly.  · Diet and lifestyle changes are the most important steps in preventing hypertension.  · By keeping your blood pressure in a healthy range, you can prevent complications like heart attack, heart failure, stroke, and kidney failure.  · Work with your health care provider to make a hypertension prevention plan that works for you.  This information is not intended to replace advice given to you by  your health care provider. Make sure you discuss any questions you have with your health care provider.  Document Revised: 04/10/2020 Document Reviewed: 08/28/2017  Elsevier Patient Education © 2020 [employer]    Preventive Care 40-64 Years Old, Female  Preventive care refers to visits with your health care provider and lifestyle choices that can promote health and wellness. This includes:  · A yearly physical exam. This may also be called an annual well check.  · Regular dental visits and eye exams.  · Immunizations.  · Screening for certain conditions.  · Healthy lifestyle choices, such as eating a healthy diet, getting regular exercise, not using drugs or products that contain nicotine and tobacco, and limiting alcohol use.  What can I expect for my preventive care visit?  Physical exam  Your health care provider will check your:  · Height and weight. This may be used to calculate body mass index (BMI), which tells if you are at a healthy weight.  · Heart rate and blood pressure.  · Skin for abnormal spots.  Counseling  Your health care provider may ask you questions about your:  · Alcohol, tobacco, and drug use.  · Emotional well-being.  · Home and relationship well-being.  · Sexual activity.  · Eating habits.  · Work and work environment.  · Method of birth control.  · Menstrual cycle.  · Pregnancy history.  What immunizations do I need?    Influenza (flu) vaccine  · This is recommended every year.  Tetanus, diphtheria, and pertussis (Tdap) vaccine  · You may need a Td booster every 10 years.  Varicella (chickenpox) vaccine  · You may need this if you have not been vaccinated.  Zoster (shingles) vaccine  · You may need this after age 60.  Measles, mumps, and rubella (MMR) vaccine  · You may need at least one dose of MMR if you were born in 1957 or later. You may also need a second dose.  Pneumococcal conjugate (PCV13) vaccine  · You may need this if you have certain conditions and were not previously  vaccinated.  Pneumococcal polysaccharide (PPSV23) vaccine  · You may need one or two doses if you smoke cigarettes or if you have certain conditions.  Meningococcal conjugate (MenACWY) vaccine  · You may need this if you have certain conditions.  Hepatitis A vaccine  · You may need this if you have certain conditions or if you travel or work in places where you may be exposed to hepatitis A.  Hepatitis B vaccine  · You may need this if you have certain conditions or if you travel or work in places where you may be exposed to hepatitis B.  Haemophilus influenzae type b (Hib) vaccine  · You may need this if you have certain conditions.  Human papillomavirus (HPV) vaccine  · If recommended by your health care provider, you may need three doses over 6 months.  You may receive vaccines as individual doses or as more than one vaccine together in one shot (combination vaccines). Talk with your health care provider about the risks and benefits of combination vaccines.  What tests do I need?  Blood tests  · Lipid and cholesterol levels. These may be checked every 5 years, or more frequently if you are over 50 years old.  · Hepatitis C test.  · Hepatitis B test.  Screening  · Lung cancer screening. You may have this screening every year starting at age 55 if you have a 30-pack-year history of smoking and currently smoke or have quit within the past 15 years.  · Colorectal cancer screening. All adults should have this screening starting at age 50 and continuing until age 75. Your health care provider may recommend screening at age 45 if you are at increased risk. You will have tests every 1-10 years, depending on your results and the type of screening test.  · Diabetes screening. This is done by checking your blood sugar (glucose) after you have not eaten for a while (fasting). You may have this done every 1-3 years.  · Mammogram. This may be done every 1-2 years. Talk with your health care provider about when you should start  having regular mammograms. This may depend on whether you have a family history of breast cancer.  · BRCA-related cancer screening. This may be done if you have a family history of breast, ovarian, tubal, or peritoneal cancers.  · Pelvic exam and Pap test. This may be done every 3 years starting at age 21. Starting at age 30, this may be done every 5 years if you have a Pap test in combination with an HPV test.  Other tests  · Sexually transmitted disease (STD) testing.  · Bone density scan. This is done to screen for osteoporosis. You may have this scan if you are at high risk for osteoporosis.  Follow these instructions at home:  Eating and drinking  · Eat a diet that includes fresh fruits and vegetables, whole grains, lean protein, and low-fat dairy.  · Take vitamin and mineral supplements as recommended by your health care provider.  · Do not drink alcohol if:  ? Your health care provider tells you not to drink.  ? You are pregnant, may be pregnant, or are planning to become pregnant.  · If you drink alcohol:  ? Limit how much you have to 0-1 drink a day.  ? Be aware of how much alcohol is in your drink. In the [place] one drink equals one 12 oz bottle of beer (355 mL), one 5 oz glass of wine (148 mL), or one 1½ oz glass of hard liquor (44 mL).  Lifestyle  · Take daily care of your teeth and gums.  · Stay active. Exercise for at least 30 minutes on 5 or more days each week.  · Do not use any products that contain nicotine or tobacco, such as cigarettes, e-cigarettes, and chewing tobacco. If you need help quitting, ask your health care provider.  · If you are sexually active, practice safe sex. Use a condom or other form of birth control (contraception) in order to prevent pregnancy and STIs (sexually transmitted infections).  · If told by your health care provider, take low-dose aspirin daily starting at age 50.  What's next?  · Visit your health care provider once a year for a well check visit.  · Ask your health  care provider how often you should have your eyes and teeth checked.  · Stay up to date on all vaccines.  This information is not intended to replace advice given to you by your health care provider. Make sure you discuss any questions you have with your health care provider.  Document Revised: 08/29/2019 Document Reviewed: 08/29/2019  Elsevier Patient Education © 2020 Elsevier Inc.

## 2021-02-02 ENCOUNTER — TELEPHONE (OUTPATIENT)
Dept: FAMILY MEDICINE CLINIC | Facility: CLINIC | Age: 56
End: 2021-02-02

## 2021-02-02 LAB
CYTOLOGIST CVX/VAG CYTO: NORMAL
CYTOLOGY CVX/VAG DOC CYTO: NORMAL
CYTOLOGY CVX/VAG DOC THIN PREP: NORMAL
DX ICD CODE: NORMAL
HIV 1 & 2 AB SER-IMP: NORMAL
OTHER STN SPEC: NORMAL
STAT OF ADQ CVX/VAG CYTO-IMP: NORMAL

## 2021-02-09 DIAGNOSIS — R19.5 POSITIVE COLORECTAL CANCER SCREENING USING COLOGUARD TEST: Primary | ICD-10-CM

## 2021-02-22 ENCOUNTER — TELEPHONE (OUTPATIENT)
Dept: FAMILY MEDICINE CLINIC | Facility: CLINIC | Age: 56
End: 2021-02-22

## 2021-02-22 NOTE — TELEPHONE ENCOUNTER
Caller: Crista Chacon    Relationship to patient: Self    Best call back number: [phone]    Chief complaint: REFERRAL    Type of visit: GASTROENTEROLOGIST    Requested date: EARLY MORNINGS    Additional notes: PATIENT STATES THAT SHE WOULD LIKE TO BE REFERRED TO THE GASTROENTEROLOGIST IN [place]. IF POSSIBLE THE PATIENT WOULD LIKE AN EARLY APPOINTMENT 8AM.

## 2021-02-22 NOTE — TELEPHONE ENCOUNTER
Called dr corey to schedule but they just want the referral and positive cologuard and will schedule the patient

## 2021-10-21 DIAGNOSIS — M51.36 DDD (DEGENERATIVE DISC DISEASE), LUMBAR: ICD-10-CM

## 2021-10-21 DIAGNOSIS — M54.50 CHRONIC BILATERAL LOW BACK PAIN WITHOUT SCIATICA: ICD-10-CM

## 2021-10-21 DIAGNOSIS — G89.29 CHRONIC BILATERAL LOW BACK PAIN WITHOUT SCIATICA: ICD-10-CM

## 2021-10-21 NOTE — TELEPHONE ENCOUNTER
Caller: Crista Chacon    Relationship: Self      Medication requested (name and dosage)    meloxicam (MOBIC) 15 MG tablet    Requested Prescriptions: meloxicam (MOBIC) 15 MG tablet     Pharmacy where request should be sent:     [facility] [address] [place], [place] [id number] [place] [phone]         Additional details provided by patient:  PATIENT WOULD LIKE A CALL BACK TO INFORM HER THAT THE PRESCRIPTION HAS BEEN RENEWED.    Best call back number: [phone]    Does the patient have less than a 3 day supply:  [] Yes  [x] No    Anabell Payne Rep   10/21/21 11:39 EDT

## 2021-10-22 RX ORDER — MELOXICAM 15 MG/1
15 TABLET ORAL DAILY
Qty: 90 TABLET | Refills: 0 | Status: SHIPPED | OUTPATIENT
Start: 2021-10-22 | End: 2022-03-01

## 2021-10-22 NOTE — TELEPHONE ENCOUNTER
Please inform patient I am renewing meloxicam however reviewing chart do not see any recent blood work.  Do recommend checking CMP, CBC and lipid panel.  She is willing send order to the lab of her choice.

## 2021-10-25 NOTE — TELEPHONE ENCOUNTER
Patient was a little harsh and stated she did not want to go get blood work that she didn't need and that she just saw Dr. Ron a year ago

## 2021-12-06 ENCOUNTER — TELEPHONE (OUTPATIENT)
Dept: FAMILY MEDICINE CLINIC | Facility: CLINIC | Age: 56
End: 2021-12-06

## 2021-12-06 NOTE — TELEPHONE ENCOUNTER
Caller: Crista Chacon    Relationship: Self    Best call back number: [phone] (H)    What is the best time to reach you: ANYTIME    Who are you requesting to speak with (clinical staff, provider,  specific staff member): CLINICAL STAFF    What was the call regarding: PATIENT IS INQUIRING WHY SHE WOULD NEED TO MAKE A FOLLOW UP APPOINTMENT PATIENT IS ALSO INQUIRING WHEN HER LAST TETANUS SHOT WAS    Do you require a callback: YES

## 2022-03-01 ENCOUNTER — TELEPHONE (OUTPATIENT)
Dept: FAMILY MEDICINE CLINIC | Facility: CLINIC | Age: 57
End: 2022-03-01

## 2022-03-01 DIAGNOSIS — G89.29 CHRONIC BILATERAL LOW BACK PAIN WITHOUT SCIATICA: ICD-10-CM

## 2022-03-01 DIAGNOSIS — M54.50 CHRONIC BILATERAL LOW BACK PAIN WITHOUT SCIATICA: ICD-10-CM

## 2022-03-01 DIAGNOSIS — M51.36 DDD (DEGENERATIVE DISC DISEASE), LUMBAR: ICD-10-CM

## 2022-03-01 RX ORDER — MELOXICAM 15 MG/1
TABLET ORAL
Qty: 90 TABLET | Refills: 0 | Status: SHIPPED | OUTPATIENT
Start: 2022-03-01 | End: 2022-03-08 | Stop reason: SDUPTHER

## 2022-03-01 NOTE — TELEPHONE ENCOUNTER
Please inform patient I am renewing medication however it has been 1 year since she was seen.  Please have her schedule annual physical exam.

## 2022-03-08 ENCOUNTER — OFFICE VISIT (OUTPATIENT)
Dept: FAMILY MEDICINE CLINIC | Facility: CLINIC | Age: 57
End: 2022-03-08

## 2022-03-08 VITALS
OXYGEN SATURATION: 99 % | TEMPERATURE: 97.1 F | WEIGHT: 131 LBS | RESPIRATION RATE: 18 BRPM | SYSTOLIC BLOOD PRESSURE: 127 MMHG | HEIGHT: 62 IN | HEART RATE: 70 BPM | BODY MASS INDEX: 24.11 KG/M2 | DIASTOLIC BLOOD PRESSURE: 60 MMHG

## 2022-03-08 DIAGNOSIS — G89.29 CHRONIC BILATERAL LOW BACK PAIN WITHOUT SCIATICA: ICD-10-CM

## 2022-03-08 DIAGNOSIS — M51.36 DDD (DEGENERATIVE DISC DISEASE), LUMBAR: ICD-10-CM

## 2022-03-08 DIAGNOSIS — Z00.00 ANNUAL PHYSICAL EXAM: Primary | ICD-10-CM

## 2022-03-08 DIAGNOSIS — M54.50 CHRONIC BILATERAL LOW BACK PAIN WITHOUT SCIATICA: ICD-10-CM

## 2022-03-08 PROCEDURE — 99396 PREV VISIT EST AGE 40-64: CPT | Performed by: FAMILY MEDICINE

## 2022-03-08 RX ORDER — MELOXICAM 15 MG/1
15 TABLET ORAL DAILY
Qty: 90 TABLET | Refills: 3 | Status: SHIPPED | OUTPATIENT
Start: 2022-03-08

## 2022-03-08 NOTE — PROGRESS NOTES
"Shyam Chacon is a 56 y.o. female.     Chief Complaint   Patient presents with   • Annual Exam     History of Present Illness     The patient is here: for coordination of medical care.  Last comprehensive physical was on 1/29/2021.  Patient's previous physician was [place].  Overall has: moderate activity with work/home activities, good appetite, good energy level and is sleeping well. Nutrition: appropriate balanced diet. Last tetanus shot was >10 years ago.   Patient is doing routine self breast exams: monthly. The [facility] [place]. Unsure when it was done.   Last Completed Pap Smear          PAP SMEAR (Every 3 Years) Next due on 1/29/2024 01/29/2021  Pap IG (Image Guided)            Diagnostic bilateral mammogram March 21, 2015 read as BI-RADS 4, biopsy recommended. she did have a biopsy on April 8, 2015 for a 16 x 6 x 10 mm mass in upper outer quadrant of the right breast pathology revealed fibrous mastopathy and focal fibroadenoma toyed change negative for ductal carcinoma.  Was recommended to follow-up with a 6-month diagnostic right mammogram and right breast ultrasound which patient did not follow through.  Previously had told me she felt  \"the process was a scam.  And was caught  with an unexpected $1000 medical bill \".she still feels this way and will not schedule another mammogram.  She is continuing SBE do not notice any lumps or bumps, no changes.     Had biometric screen at [facility] in December told labs good. Will call to get those records.    2/8/21 cologuard + colonoscopy was negative    One period in last year lasted 2 days.    Using homemade herbal healing salve works as well of better than topical steroids.     Use Meloxicam for chronic back pain is more helpful than not doing anything. Had minor surgery years ago.         Recent Hospitalizations:  No hospitalization(s) within the last year..  ccc      I personally reviewed and updated the " "patient's allergies, medications, problem list, and past medical, surgical, social, and family history. I have reviewed and confirmed the accuracy of the HPI and ROS as documented by the MA/LPN/RN Chandni Ron MD    Family History   Problem Relation Age of Onset   • Hypertension Mother    • Ovarian cancer Maternal Grandmother    • Hypertension Maternal Grandfather    • Alcohol abuse Maternal Grandfather    • Stroke Maternal Grandfather        Social History     Tobacco Use   • Smoking status: Current Every Day Smoker     Packs/day: 0.25     Years: 35.00     Pack years: 8.75     Types: Cigarettes     Start date: 2002   • Smokeless tobacco: Never Used   Substance Use Topics   • Alcohol use: No   • Drug use: No       Past Surgical History:   Procedure Laterality Date   • BREAST BIOPSY  04/08/2015   • LAPAROSCOPIC TUBAL LIGATION  1995   • PAP SMEAR  03/12/2015       Patient Active Problem List   Diagnosis   • Anal itching   • Arthritis   • Breast mass   • Chronic low back pain   • Elevated blood pressure reading without diagnosis of hypertension   • Encounter for other administrative examinations   • Encounter for immunization   • Snoring   • Syncope   • Tobacco dependence syndrome   • Vaginitis and vulvovaginitis         Current Outpatient Medications:   •  Garlic 500 MG capsule, Take 1 capsule by mouth Daily., Disp: , Rfl:   •  meloxicam (MOBIC) 15 MG tablet, Take 1 tablet by mouth Daily., Disp: 90 tablet, Rfl: 3  •  Multiple Vitamins-Minerals (CENTRUM SILVER 50+WOMEN PO), Take 1 tablet by mouth Daily., Disp: , Rfl:   •  VITAMIN B COMPLEX-C PO, Take 1 capsule by mouth Daily., Disp: , Rfl:       Objective   /60   Pulse 70   Temp 97.1 °F (36.2 °C) (Temporal)   Resp 18   Ht 157.5 cm (62\")   Wt 59.4 kg (131 lb)   SpO2 99%   BMI 23.96 kg/m²   BP Readings from Last 3 Encounters:   03/08/22 127/60   01/29/21 133/87   11/23/20 139/89     Wt Readings from Last 3 Encounters:   03/08/22 59.4 kg (131 lb) "   01/29/21 55.8 kg (123 lb)   11/23/20 54.4 kg (120 lb)     Physical Exam  Vitals and nursing note reviewed.   Constitutional:       General: She is not in acute distress.     Appearance: She is well-developed.   HENT:      Head: Normocephalic and atraumatic.      Right Ear: External ear normal.      Left Ear: External ear normal.      Nose: Nose normal.   Eyes:      General: No scleral icterus.        Right eye: No discharge.         Left eye: No discharge.      Conjunctiva/sclera: Conjunctivae normal.      Pupils: Pupils are equal, round, and reactive to light.   Cardiovascular:      Rate and Rhythm: Normal rate and regular rhythm.      Heart sounds: No murmur heard.  Pulmonary:      Effort: Pulmonary effort is normal.      Breath sounds: No wheezing.      Comments: Breast exam with nontender fibrocystic changes greatest in the upper outer quadrants bilaterally without suspicious masses or lesions  Abdominal:      General: Bowel sounds are normal. There is no distension.      Palpations: Abdomen is soft. There is no mass.      Tenderness: There is no abdominal tenderness.      Hernia: No hernia is present.   Genitourinary:     Vagina: Normal.      Adnexa:         Right: No tenderness.          Left: No tenderness.        Comments: Declined, had Pap last year  Musculoskeletal:         General: Normal range of motion.      Cervical back: Normal range of motion.   Lymphadenopathy:      Cervical: No cervical adenopathy.   Skin:     General: Skin is warm and dry.   Neurological:      Mental Status: She is alert and oriented to person, place, and time.           Age-appropriate Screening Schedule:  Refer to the list below for future screening recommendations based on patient's age, sex and/or medical conditions. Orders for these recommended tests are listed in the plan section. The patient has been provided with a written plan.    Health Maintenance   Topic Date Due   • ZOSTER VACCINE (1 of 2) 03/08/2022 (Originally  5/16/2016)   • INFLUENZA VACCINE  03/08/2023 (Originally 8/1/2021)   • MAMMOGRAM  03/08/2023 (Originally 1965)   • PAP SMEAR  01/29/2024   • TDAP/TD VACCINES (2 - Td or Tdap) 12/09/2031           Assessment/Plan      Medications        Problem List         LOS        Diagnoses and all orders for this visit:    1. Annual physical exam (Primary)    2. Chronic bilateral low back pain without sciatica  -     meloxicam (MOBIC) 15 MG tablet; Take 1 tablet by mouth Daily.  Dispense: 90 tablet; Refill: 3    3. DDD (degenerative disc disease), lumbar  -     meloxicam (MOBIC) 15 MG tablet; Take 1 tablet by mouth Daily.  Dispense: 90 tablet; Refill: 3      The patient was counseled regarding nutrition, physical activity, healthy weight, injury prevention, immunizations and preventative health screenings.  Patient did have a tetanus shot in December, she keeps up with these every 5 years.  She declines any other vaccinations at this time specifically Shingrix, Pneumovax, Covid, and influenza.    She will continue meloxicam for chronic low back pain and is aware of proper back care including lifting techniques.    Return in about 1 year (around 3/8/2023) for Annual physical.    Expected course, medications, and adverse effects discussed.  Call or return if worsening or persistent symptoms.  I wore protective equipment throughout this patient encounter including a mask and eye protection.  Hand hygiene was performed before donning protective equipment and after removal when leaving the room. The complete contents of the Assessment and Plan and Data / Lab Results as documented above have been reviewed and addressed by myself with the patient today as part of an ongoing evaluation / treatment plan.  If some of the documentation has been copied from a previous note and is unchanged it indicates that this problem / plan has been assessed today but is stable from a previous visit and no changes have been recommended.

## 2023-04-18 NOTE — PROGRESS NOTES
"  Chief Complaint  Back Pain    History of Present Illness  Crista Chacon presents today for chronic back pain.    Back Pain: Patient complains of lumbar spine pain which is described as aching.  Patient denies numbness/tingling which does not radiate to legs.   She reports that the pain has been present for several years.  She reports pain in mid to lower back  (aching in character; 5/10 in severity)..  Symptoms are relieved by acetaminophen, NSAIDs, rest and Meloxicam.  The back problem is not work related.  She denies fever, bladder or bowel incontinence, or weakness to the hips or legs.     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    Having floaters in vision have had evaluation with ophthalmologist. Told nothing to do with it at this time.       Current Outpatient Medications on File Prior to Visit   Medication Sig   • Garlic 500 MG capsule Take 1 capsule by mouth Daily.   • Multiple Vitamins-Minerals (CENTRUM SILVER 50+WOMEN PO) Take 1 tablet by mouth Daily.   • VITAMIN B COMPLEX-C PO Take 1 capsule by mouth Daily.   • [DISCONTINUED] meloxicam (MOBIC) 15 MG tablet Take 1 tablet by mouth Daily.     No current facility-administered medications on file prior to visit.       Objective   Vital Signs:   /70 (BP Location: Right arm, Patient Position: Sitting, Cuff Size: Adult)   Pulse 70   Temp 97.3 °F (36.3 °C) (Temporal)   Resp 18   Ht 157.5 cm (62\")   Wt 55.6 kg (122 lb 8 oz)   SpO2 100% Comment: room air  BMI 22.41 kg/m²       Physical Exam  Vitals and nursing note reviewed.   Constitutional:       General: She is not in acute distress.     Appearance: Normal appearance. She is well-developed and normal weight. She is not ill-appearing.   HENT:      Head: Normocephalic and atraumatic.   Cardiovascular:      Rate and Rhythm: Normal rate and " regular rhythm.      Heart sounds: No murmur heard.  Pulmonary:      Effort: Pulmonary effort is normal.      Breath sounds: Normal breath sounds. No wheezing.   Musculoskeletal:         General: Normal range of motion.   Skin:     General: Skin is warm and dry.      Findings: No rash.   Neurological:      Mental Status: She is alert and oriented to person, place, and time.   Psychiatric:         Mood and Affect: Mood normal.         Thought Content: Thought content normal.                          DATE OF EXAM:  7/29/2019 12:52 PM     PROCEDURE:  XR SPINE LUMBAR 2 OR 3 VW-     INDICATIONS:  lumbar pain; M54.5-Low back pain; G89.29-Other chronic pain;  Z98.890-Other specified postprocedural states     COMPARISON:  No Comparisons Available     TECHNIQUE:   Two to three radiologic views of the lumbosacral spine were obtained.     FINDINGS:  3 views lumbar spine. 5 nonrib-bearing lumbar-type dural bodies.  Moderate levoconvex curvature lumbar spine. Retrolisthesis L5 on S1  measures 6 mm. There is severe loss of disc heights L4-L5 and L5-S1 and  moderate to severe loss of disc at L3-L4. Mild loss of disc height at  other levels. Moderate facet degenerative change lower lumbar spine.        IMPRESSION:  Moderately levoconvex curvature lumbar spine with severe degenerative  disc disease L4-L5 and L5-S1.     Electronically Signed By-Stephani Bazan On:7/29/2019 1:03 PM  This report was finalized on [id number] by  Stephani Bazan, .     Assessment and Plan    Diagnoses and all orders for this visit:    1. Chronic bilateral low back pain without sciatica (Primary)  -     meloxicam (MOBIC) 15 MG tablet; Take 1 tablet by mouth Daily.  Dispense: 90 tablet; Refill: 2    2. Tobacco dependence syndrome    3. Floaters in visual field, bilateral    4. DDD (degenerative disc disease), lumbar  -     meloxicam (MOBIC) 15 MG tablet; Take 1 tablet by mouth Daily.  Dispense: 90 tablet; Refill: 2    5. Screening for cholesterol level  -      Lipid Panel    6. NSAID long-term use  -     CBC & Differential  -     Comprehensive Metabolic Panel      Patient has chronic low back pain from degenerative disc disease of the lumbar spine.  Fairly adequately controlled with meloxicam.  Advised that she has been taking anti-inflammatories long-term should screen for kidney disease and anemia.  Also recommend screening for cholesterol.  The patient was counseled regarding  immunizations and preventative health screenings, she declines immunizations or mammogram at this time.      Medications Discontinued During This Encounter   Medication Reason   • meloxicam (MOBIC) 15 MG tablet Reorder         Follow Up     Return in about 6 months (around 10/24/2023) for Annual physical.    Patient was given instructions and counseling regarding her condition or for health maintenance advice. Please see specific information pulled into the AVS if appropriate.

## 2023-04-24 ENCOUNTER — OFFICE VISIT (OUTPATIENT)
Dept: FAMILY MEDICINE CLINIC | Facility: CLINIC | Age: 58
End: 2023-04-24
Payer: COMMERCIAL

## 2023-04-24 VITALS
TEMPERATURE: 97.3 F | DIASTOLIC BLOOD PRESSURE: 70 MMHG | WEIGHT: 122.5 LBS | OXYGEN SATURATION: 100 % | BODY MASS INDEX: 22.54 KG/M2 | SYSTOLIC BLOOD PRESSURE: 130 MMHG | HEART RATE: 70 BPM | HEIGHT: 62 IN | RESPIRATION RATE: 18 BRPM

## 2023-04-24 DIAGNOSIS — F17.200 TOBACCO DEPENDENCE SYNDROME: ICD-10-CM

## 2023-04-24 DIAGNOSIS — M51.36 DDD (DEGENERATIVE DISC DISEASE), LUMBAR: ICD-10-CM

## 2023-04-24 DIAGNOSIS — Z13.220 SCREENING FOR CHOLESTEROL LEVEL: ICD-10-CM

## 2023-04-24 DIAGNOSIS — G89.29 CHRONIC BILATERAL LOW BACK PAIN WITHOUT SCIATICA: Primary | ICD-10-CM

## 2023-04-24 DIAGNOSIS — H43.393 FLOATERS IN VISUAL FIELD, BILATERAL: ICD-10-CM

## 2023-04-24 DIAGNOSIS — M54.50 CHRONIC BILATERAL LOW BACK PAIN WITHOUT SCIATICA: Primary | ICD-10-CM

## 2023-04-24 DIAGNOSIS — Z79.1 NSAID LONG-TERM USE: ICD-10-CM

## 2023-04-24 PROCEDURE — 99214 OFFICE O/P EST MOD 30 MIN: CPT | Performed by: FAMILY MEDICINE

## 2023-04-24 RX ORDER — MELOXICAM 15 MG/1
15 TABLET ORAL DAILY
Qty: 90 TABLET | Refills: 2 | Status: SHIPPED | OUTPATIENT
Start: 2023-04-24

## 2023-04-25 LAB
ALBUMIN SERPL-MCNC: 4.5 G/DL (ref 3.8–4.9)
ALBUMIN/GLOB SERPL: 2 {RATIO} (ref 1.2–2.2)
ALP SERPL-CCNC: 107 IU/L (ref 44–121)
ALT SERPL-CCNC: 17 IU/L (ref 0–32)
AST SERPL-CCNC: 14 IU/L (ref 0–40)
BASOPHILS # BLD AUTO: 0.1 X10E3/UL (ref 0–0.2)
BASOPHILS NFR BLD AUTO: 1 %
BILIRUB SERPL-MCNC: 0.3 MG/DL (ref 0–1.2)
BUN SERPL-MCNC: 20 MG/DL (ref 6–24)
BUN/CREAT SERPL: 29 (ref 9–23)
CALCIUM SERPL-MCNC: 10 MG/DL (ref 8.7–10.2)
CHLORIDE SERPL-SCNC: 104 MMOL/L (ref 96–106)
CHOLEST SERPL-MCNC: 219 MG/DL (ref 100–199)
CO2 SERPL-SCNC: 28 MMOL/L (ref 20–29)
CREAT SERPL-MCNC: 0.69 MG/DL (ref 0.57–1)
EGFRCR SERPLBLD CKD-EPI 2021: 102 ML/MIN/1.73
EOSINOPHIL # BLD AUTO: 0.2 X10E3/UL (ref 0–0.4)
EOSINOPHIL NFR BLD AUTO: 2 %
ERYTHROCYTE [DISTWIDTH] IN BLOOD BY AUTOMATED COUNT: 13.1 % (ref 11.7–15.4)
GLOBULIN SER CALC-MCNC: 2.2 G/DL (ref 1.5–4.5)
GLUCOSE SERPL-MCNC: 88 MG/DL (ref 70–99)
HCT VFR BLD AUTO: 43.1 % (ref 34–46.6)
HDLC SERPL-MCNC: 61 MG/DL
HGB BLD-MCNC: 14.2 G/DL (ref 11.1–15.9)
IMM GRANULOCYTES # BLD AUTO: 0 X10E3/UL (ref 0–0.1)
IMM GRANULOCYTES NFR BLD AUTO: 0 %
LDLC SERPL CALC-MCNC: 139 MG/DL (ref 0–99)
LYMPHOCYTES # BLD AUTO: 2.4 X10E3/UL (ref 0.7–3.1)
LYMPHOCYTES NFR BLD AUTO: 27 %
MCH RBC QN AUTO: 30.2 PG (ref 26.6–33)
MCHC RBC AUTO-ENTMCNC: 32.9 G/DL (ref 31.5–35.7)
MCV RBC AUTO: 92 FL (ref 79–97)
MONOCYTES # BLD AUTO: 0.5 X10E3/UL (ref 0.1–0.9)
MONOCYTES NFR BLD AUTO: 5 %
NEUTROPHILS # BLD AUTO: 6 X10E3/UL (ref 1.4–7)
NEUTROPHILS NFR BLD AUTO: 65 %
PLATELET # BLD AUTO: 260 X10E3/UL (ref 150–450)
POTASSIUM SERPL-SCNC: 4.7 MMOL/L (ref 3.5–5.2)
PROT SERPL-MCNC: 6.7 G/DL (ref 6–8.5)
RBC # BLD AUTO: 4.7 X10E6/UL (ref 3.77–5.28)
SODIUM SERPL-SCNC: 144 MMOL/L (ref 134–144)
TRIGL SERPL-MCNC: 108 MG/DL (ref 0–149)
VLDLC SERPL CALC-MCNC: 19 MG/DL (ref 5–40)
WBC # BLD AUTO: 9.1 X10E3/UL (ref 3.4–10.8)

## 2023-04-26 ENCOUNTER — TELEPHONE (OUTPATIENT)
Dept: FAMILY MEDICINE CLINIC | Facility: CLINIC | Age: 58
End: 2023-04-26
Payer: COMMERCIAL

## 2023-04-26 NOTE — TELEPHONE ENCOUNTER
----- Message from Chandni Ron MD sent at 4/25/2023  1:14 PM EDT -----  Please contact patient and inform that cholesterol is a little above ideal.  Do recommend following a low cholesterol diet (reducing fats from animal sources).  BUN to creatinine ratio is a little elevated which can indicate mild dehydration, would recommend increased water/fluid intake.  Otherwise CMP and CBC are completely within normal limits.      The 10-year ASCVD risk score (Chuy ESTRADA, et al., 2019) is: 5.3%    Values used to calculate the score:      Age: 57 years      Sex: Female      Is Non- : No      Diabetic: No      Tobacco smoker: Yes      Systolic Blood Pressure: 130 mmHg      Is BP treated: No      HDL Cholesterol: 61 mg/dL      Total Cholesterol: 219 mg/dL

## 2023-09-13 ENCOUNTER — TELEPHONE (OUTPATIENT)
Dept: FAMILY MEDICINE CLINIC | Facility: CLINIC | Age: 58
End: 2023-09-13
Payer: COMMERCIAL

## 2023-09-13 DIAGNOSIS — G89.29 CHRONIC BILATERAL LOW BACK PAIN WITHOUT SCIATICA: ICD-10-CM

## 2023-09-13 DIAGNOSIS — M54.50 CHRONIC BILATERAL LOW BACK PAIN WITHOUT SCIATICA: ICD-10-CM

## 2023-09-13 DIAGNOSIS — M51.36 DDD (DEGENERATIVE DISC DISEASE), LUMBAR: ICD-10-CM

## 2023-09-13 RX ORDER — MELOXICAM 15 MG/1
15 TABLET ORAL DAILY
Qty: 90 TABLET | Refills: 2 | Status: SHIPPED | OUTPATIENT
Start: 2023-09-13

## 2023-09-13 NOTE — TELEPHONE ENCOUNTER
Received call from patient, she is concerned she will not have enough refills of meloxicam until her next appointment.  I advise she is due for an annual now.  She works at a [employer] and would like to schedule in her off season in January.  She will contact office to schedule that appointment in January and I am renewing meloxicam.

## 2024-01-08 NOTE — PROGRESS NOTES
Chief Complaint   Patient presents with    Annual Exam       History of Present Illness  Subjective   Crista Chacon is a 58 y.o. female.       The patient is here: for coordination of medical care.  Last comprehensive physical was on 03/08/2022.  Previous physical was performed by  Chandni Ron MD  Overall has: minimal activity with work/home activities, exercises 2 - 3 times per week, good appetite, feels well with minor complaints, good energy level, and is sleeping well. Nutrition: balanced diet. Last tetanus shot was  12/09/2021 . Patient is doing routine self breast exams: monthly    Influenza immunization was not given due to patient refusal.    Health Maintenance   Topic Date Due    MAMMOGRAM  Never done    COVID-19 Vaccine (1) Never done    ZOSTER VACCINE (1 of 2) Never done    HEPATITIS C SCREENING  Never done    ANNUAL PHYSICAL  03/08/2023    INFLUENZA VACCINE  08/01/2023    Pneumococcal Vaccine 0-64 (1 of 2 - PCV) 05/16/2031 (Originally 5/16/1971)    PAP SMEAR  01/29/2024    COLORECTAL CANCER SCREENING  03/15/2031    TDAP/TD VACCINES (2 - Td or Tdap) 12/09/2031       PHQ-9 Depression Screening  Little interest or pleasure in doing things? 0-->not at all   Feeling down, depressed, or hopeless? 0-->not at all   Trouble falling or staying asleep, or sleeping too much?     Feeling tired or having little energy?     Poor appetite or overeating?     Feeling bad about yourself - or that you are a failure or have let yourself or your family down?     Trouble concentrating on things, such as reading the newspaper or watching television?     Moving or speaking so slowly that other people could have noticed? Or the opposite - being so fidgety or restless that you have been moving around a lot more than usual?     Thoughts that you would be better off dead, or of hurting yourself in some way?     PHQ-9 Total Score 0   If you checked off any problems, how difficult have these problems made it for  you to do your work, take care of things at home, or get along with other people?             Recent Hospitalizations:  No hospitalization(s) within the last year..  ccc      I personally reviewed and updated the patient's allergies, medications, problem list, and past medical, surgical, social, and family history. I have reviewed and confirmed the accuracy of the HPI and ROS as documented by the MA/LPN/RN Chandni Ron MD    Family History   Problem Relation Age of Onset    Hypertension Mother     Ovarian cancer Maternal Grandmother     Hypertension Maternal Grandfather     Alcohol abuse Maternal Grandfather     Stroke Maternal Grandfather        Social History     Tobacco Use    Smoking status: Some Days     Packs/day: 0.25     Years: 41.00     Additional pack years: 0.00     Total pack years: 10.25     Types: Cigarettes     Start date: 1/1/1982    Smokeless tobacco: Never   Substance Use Topics    Alcohol use: No    Drug use: No       Past Surgical History:   Procedure Laterality Date    BREAST BIOPSY  04/08/2015    LAPAROSCOPIC TUBAL LIGATION  1995    PAP SMEAR  03/12/2015       Patient Active Problem List   Diagnosis    Anal itching    Arthritis    Breast mass    Chronic low back pain    Elevated blood pressure reading without diagnosis of hypertension    Encounter for other administrative examinations    Encounter for immunization    Snoring    Syncope    Tobacco dependence syndrome    Vaginitis and vulvovaginitis    Floaters in visual field, bilateral         Current Outpatient Medications:     Garlic 500 MG capsule, Take 1 capsule by mouth Daily., Disp: , Rfl:     meloxicam (MOBIC) 15 MG tablet, Take 1 tablet by mouth Daily. (Patient taking differently: Take 1 tablet by mouth As Needed.), Disp: 90 tablet, Rfl: 2    Multiple Vitamins-Minerals (CENTRUM SILVER 50+WOMEN PO), Take 1 tablet by mouth Daily., Disp: , Rfl:     VITAMIN B COMPLEX-C PO, Take 1 capsule by mouth Daily., Disp: , Rfl:  "    Objective   /58 (BP Location: Right arm, Patient Position: Sitting, Cuff Size: Adult)   Pulse 66   Temp 98 °F (36.7 °C) (Temporal)   Resp 18   Ht 162.6 cm (64\")   Wt 56.2 kg (123 lb 12.8 oz)   SpO2 97%   BMI 21.25 kg/m²   BP Readings from Last 3 Encounters:   01/15/24 110/58   04/24/23 130/70   03/08/22 127/60     Wt Readings from Last 3 Encounters:   01/15/24 56.2 kg (123 lb 12.8 oz)   04/24/23 55.6 kg (122 lb 8 oz)   03/08/22 59.4 kg (131 lb)     Physical Exam  Vitals and nursing note reviewed.   Constitutional:       General: She is not in acute distress.     Appearance: She is well-developed.   HENT:      Head: Normocephalic and atraumatic.      Right Ear: External ear normal.      Left Ear: External ear normal.      Nose: Nose normal.   Eyes:      General: No scleral icterus.        Right eye: No discharge.         Left eye: No discharge.      Conjunctiva/sclera: Conjunctivae normal.      Pupils: Pupils are equal, round, and reactive to light.   Cardiovascular:      Rate and Rhythm: Normal rate and regular rhythm.      Heart sounds: No murmur heard.  Pulmonary:      Effort: Pulmonary effort is normal.      Breath sounds: No wheezing.      Comments: Breasts: breasts appear normal, no suspicious masses, no skin or nipple changes or axillary nodes    Abdominal:      General: Bowel sounds are normal. There is no distension.      Palpations: Abdomen is soft. There is no mass.      Tenderness: There is no abdominal tenderness.      Hernia: No hernia is present.   Genitourinary:     Vagina: Normal. No vaginal discharge.      Adnexa:         Right: No tenderness.          Left: No tenderness.        Comments: Pelvic exam: normal external genitalia, vulva, vagina, cervix, uterus and adnexa, CERVIX: normal appearing cervix without discharge or lesions, nulliparous os  Pap obtained  Musculoskeletal:         General: Normal range of motion.      Cervical back: Normal range of motion.   Lymphadenopathy:     " " Cervical: No cervical adenopathy.   Skin:     General: Skin is warm and dry.   Neurological:      Mental Status: She is alert and oriented to person, place, and time.         Data / Lab Results:  No results found for: \"HGBA1C\"  Common labs          4/24/2023    12:46 1/15/2024    11:46   Common Labs   Glucose 88  94    BUN 20  14    Creatinine 0.69  0.64    Sodium 144  140    Potassium 4.7  5.1    Chloride 104  99    Calcium 10.0  10.1    Total Protein 6.7  7.4    Albumin 4.5  4.9    Total Bilirubin 0.3  0.4    Alkaline Phosphatase 107  118    AST (SGOT) 14  16    ALT (SGPT) 17  18    WBC 9.1     Hemoglobin 14.2     Hematocrit 43.1     Platelets 260     Total Cholesterol 219  256    Triglycerides 108  123    HDL Cholesterol 61  64    LDL Cholesterol  139  170      The 10-year ASCVD risk score (Chuy ESTRADA, et al., 2019) is: 4.6%    Values used to calculate the score:      Age: 58 years      Sex: Female      Is Non- : No      Diabetic: No      Tobacco smoker: Yes      Systolic Blood Pressure: 110 mmHg      Is BP treated: No      HDL Cholesterol: 64 mg/dL      Total Cholesterol: 256 mg/dL    Age-appropriate Screening Schedule:  Refer to the list below for future screening recommendations based on patient's age, sex and/or medical conditions. Orders for these recommended tests are listed in the plan section. The patient has been provided with a written plan.        Assessment & Plan      Medications        Problem List         LOS        Diagnoses and all orders for this visit:    1. Annual physical exam (Primary)    2. Current smoker    3. Encounter for screening mammogram for breast cancer    4. Screening for cholesterol level  -     Lipid Panel    5. Screening for diabetes mellitus  -     Comprehensive Metabolic Panel    6. Encounter for hepatitis C screening test for low risk patient    7. Screening for colon cancer    8. Screening for cervical cancer  -     IGP,CtNg,AptimaHPV,rfx16 / " 18,45        The patient was counseled regarding nutrition, physical activity, healthy weight, injury prevention, immunizations and preventative health screenings.  Lipid panel and CMP as ordered above.  She declines hepatitis C screening.  Pap obtained.  Patient declines referral for mammogram.  Declines vaccinations including influenza, COVID, Shingrix.    Had positive cologuard 3 year ago.  patient had colonoscopy with Dr. Finley in [place] March 2021.  Will obtain colonoscopy report and any pathology reports to determine recommended follow up:  Addendum reviewed colonoscopy in pathology report dated 3/15/2021, Dr. Finley, single 10 m polyp colon biopsy fragments of tubular adenoma negative for high-grade dysplasia or malignancy.  Repeat in 7 to 10 years.        Return in about 1 year (around 1/15/2025) for Annual physical.      Expected course, medications, and adverse effects discussed.    Call or return if worsening or persistent symptoms.   Hand hygiene was performed before and after exam.   The complete contents of the Assessment and Plan and Data / Lab Results as documented above have been reviewed and addressed by myself with the patient today as part of an ongoing evaluation / treatment plan.    If separate E/M service has been provided today it was significant, medically necessary, and separately identifiable.

## 2024-01-15 ENCOUNTER — TELEPHONE (OUTPATIENT)
Dept: FAMILY MEDICINE CLINIC | Facility: CLINIC | Age: 59
End: 2024-01-15

## 2024-01-15 ENCOUNTER — OFFICE VISIT (OUTPATIENT)
Dept: FAMILY MEDICINE CLINIC | Facility: CLINIC | Age: 59
End: 2024-01-15
Payer: COMMERCIAL

## 2024-01-15 VITALS
HEIGHT: 64 IN | WEIGHT: 123.8 LBS | BODY MASS INDEX: 21.13 KG/M2 | OXYGEN SATURATION: 97 % | DIASTOLIC BLOOD PRESSURE: 58 MMHG | SYSTOLIC BLOOD PRESSURE: 110 MMHG | TEMPERATURE: 98 F | HEART RATE: 66 BPM | RESPIRATION RATE: 18 BRPM

## 2024-01-15 DIAGNOSIS — Z12.11 SCREENING FOR COLON CANCER: ICD-10-CM

## 2024-01-15 DIAGNOSIS — Z00.00 ANNUAL PHYSICAL EXAM: Primary | ICD-10-CM

## 2024-01-15 DIAGNOSIS — Z13.1 SCREENING FOR DIABETES MELLITUS: ICD-10-CM

## 2024-01-15 DIAGNOSIS — Z13.220 SCREENING FOR CHOLESTEROL LEVEL: ICD-10-CM

## 2024-01-15 DIAGNOSIS — Z11.59 ENCOUNTER FOR HEPATITIS C SCREENING TEST FOR LOW RISK PATIENT: ICD-10-CM

## 2024-01-15 DIAGNOSIS — Z12.4 SCREENING FOR CERVICAL CANCER: ICD-10-CM

## 2024-01-15 DIAGNOSIS — F17.200 CURRENT SMOKER: ICD-10-CM

## 2024-01-15 DIAGNOSIS — Z12.31 ENCOUNTER FOR SCREENING MAMMOGRAM FOR BREAST CANCER: ICD-10-CM

## 2024-01-15 NOTE — TELEPHONE ENCOUNTER
Called Dr. Finley's office to obtain colonoscopy report and pathology report from 2021. They are faxing now.

## 2024-01-16 LAB
ALBUMIN SERPL-MCNC: 4.9 G/DL (ref 3.8–4.9)
ALBUMIN/GLOB SERPL: 2 {RATIO} (ref 1.2–2.2)
ALP SERPL-CCNC: 118 IU/L (ref 44–121)
ALT SERPL-CCNC: 18 IU/L (ref 0–32)
AST SERPL-CCNC: 16 IU/L (ref 0–40)
BILIRUB SERPL-MCNC: 0.4 MG/DL (ref 0–1.2)
BUN SERPL-MCNC: 14 MG/DL (ref 6–24)
BUN/CREAT SERPL: 22 (ref 9–23)
CALCIUM SERPL-MCNC: 10.1 MG/DL (ref 8.7–10.2)
CHLORIDE SERPL-SCNC: 99 MMOL/L (ref 96–106)
CHOLEST SERPL-MCNC: 256 MG/DL (ref 100–199)
CO2 SERPL-SCNC: 27 MMOL/L (ref 20–29)
CREAT SERPL-MCNC: 0.64 MG/DL (ref 0.57–1)
EGFRCR SERPLBLD CKD-EPI 2021: 103 ML/MIN/1.73
GLOBULIN SER CALC-MCNC: 2.5 G/DL (ref 1.5–4.5)
GLUCOSE SERPL-MCNC: 94 MG/DL (ref 70–99)
HDLC SERPL-MCNC: 64 MG/DL
LDLC SERPL CALC-MCNC: 170 MG/DL (ref 0–99)
POTASSIUM SERPL-SCNC: 5.1 MMOL/L (ref 3.5–5.2)
PROT SERPL-MCNC: 7.4 G/DL (ref 6–8.5)
SODIUM SERPL-SCNC: 140 MMOL/L (ref 134–144)
TRIGL SERPL-MCNC: 123 MG/DL (ref 0–149)
VLDLC SERPL CALC-MCNC: 22 MG/DL (ref 5–40)

## 2024-01-17 DIAGNOSIS — D12.6 TUBULAR ADENOMA OF COLON: Primary | ICD-10-CM

## 2024-01-17 LAB
C TRACH RRNA CVX QL NAA+PROBE: NEGATIVE
CYTOLOGIST CVX/VAG CYTO: NORMAL
CYTOLOGY CVX/VAG DOC CYTO: NORMAL
CYTOLOGY CVX/VAG DOC THIN PREP: NORMAL
DX ICD CODE: NORMAL
HIV 1 & 2 AB SER-IMP: NORMAL
HPV GENOTYPE REFLEX: NORMAL
HPV I/H RISK 4 DNA CVX QL PROBE+SIG AMP: NEGATIVE
N GONORRHOEA RRNA CVX QL NAA+PROBE: NEGATIVE
OTHER STN SPEC: NORMAL
STAT OF ADQ CVX/VAG CYTO-IMP: NORMAL

## 2024-04-05 ENCOUNTER — TELEPHONE (OUTPATIENT)
Dept: FAMILY MEDICINE CLINIC | Facility: CLINIC | Age: 59
End: 2024-04-05
Payer: COMMERCIAL

## 2024-04-05 NOTE — TELEPHONE ENCOUNTER
Caller: Frank Chacon    Relationship: Self    Best call back number: [phone]     What is the best time to reach you: ANY    Who are you requesting to speak with (clinical staff, provider,  specific staff member): CLINICAL    Do you know the name of the person who called: FRANK    What was the call regarding: PRIOR AUTHORIZATION NEEDED FOR     meloxicam (MOBIC) 15 MG tablet FOR THE 90 DAY SUPPLY.       Is it okay if the provider responds through MyChart:

## 2024-06-18 DIAGNOSIS — M51.36 DDD (DEGENERATIVE DISC DISEASE), LUMBAR: ICD-10-CM

## 2024-06-18 DIAGNOSIS — G89.29 CHRONIC BILATERAL LOW BACK PAIN WITHOUT SCIATICA: ICD-10-CM

## 2024-06-18 DIAGNOSIS — M54.50 CHRONIC BILATERAL LOW BACK PAIN WITHOUT SCIATICA: ICD-10-CM

## 2024-06-18 RX ORDER — MELOXICAM 15 MG/1
15 TABLET ORAL DAILY
Qty: 90 TABLET | Refills: 1 | Status: SHIPPED | OUTPATIENT
Start: 2024-06-18

## 2024-06-18 NOTE — TELEPHONE ENCOUNTER
Caller: Crista Chacon    Relationship: Self    Best call back number: [phone]     Requested Prescriptions:   Requested Prescriptions     Pending Prescriptions Disp Refills    meloxicam (MOBIC) 15 MG tablet 90 tablet 2     Sig: Take 1 tablet by mouth Daily.        [facility], [address] [phone] [facility] [phone] FX     Last office visit with prescribing clinician: 1/15/2024   Last telemedicine visit with prescribing clinician: Visit date not found   Next office visit with prescribing clinician: Visit date not found     Additional details provided by patient: PATIENT IS REQUESTING A 90 DAY SUPPLY OF MEDICATION     Does the patient have less than a 3 day supply:  [] Yes  [x] No    Would you like a call back once the refill request has been completed: [] Yes [x] No    If the office needs to give you a call back, can they leave a voicemail: [] Yes [x] No    Anabell Guzman Rep   06/18/24 12:10 EDT

## 2024-11-11 DIAGNOSIS — Z13.1 SCREENING FOR DIABETES MELLITUS: ICD-10-CM

## 2024-11-11 DIAGNOSIS — G89.29 CHRONIC BILATERAL LOW BACK PAIN WITHOUT SCIATICA: ICD-10-CM

## 2024-11-11 DIAGNOSIS — M51.369 DDD (DEGENERATIVE DISC DISEASE), LUMBAR: ICD-10-CM

## 2024-11-11 DIAGNOSIS — M54.50 CHRONIC BILATERAL LOW BACK PAIN WITHOUT SCIATICA: ICD-10-CM

## 2024-11-11 DIAGNOSIS — Z13.220 SCREENING FOR CHOLESTEROL LEVEL: Primary | ICD-10-CM

## 2024-11-11 RX ORDER — MELOXICAM 15 MG/1
15 TABLET ORAL DAILY
Qty: 90 TABLET | Refills: 0 | Status: SHIPPED | OUTPATIENT
Start: 2024-11-11

## 2024-11-11 NOTE — TELEPHONE ENCOUNTER
Spoke with the patient and she voiced understanding. She scheduled her annual physical for 1/20/25 and will obtain labs prior, I am placing orders for those now.

## 2024-11-11 NOTE — TELEPHONE ENCOUNTER
Please inform Jonathan that I am renewing meloxicam and reviewing chart she has not had a blood count in over a year.  Can you please order a CBC along with a CMP and lipid panel to be done at her convenience.  She also needs to schedule an annual on or after January 15.

## 2024-11-18 DIAGNOSIS — M51.369 DDD (DEGENERATIVE DISC DISEASE), LUMBAR: ICD-10-CM

## 2024-11-18 DIAGNOSIS — M54.50 CHRONIC BILATERAL LOW BACK PAIN WITHOUT SCIATICA: ICD-10-CM

## 2024-11-18 DIAGNOSIS — G89.29 CHRONIC BILATERAL LOW BACK PAIN WITHOUT SCIATICA: ICD-10-CM

## 2024-11-18 RX ORDER — MELOXICAM 15 MG/1
15 TABLET ORAL DAILY
Qty: 90 TABLET | Refills: 0 | OUTPATIENT
Start: 2024-11-18

## 2024-11-18 NOTE — TELEPHONE ENCOUNTER
Caller: Juanita Chaconalexis Griffin    Relationship: Self    Best call back number: [phone]     Requested Prescriptions:   Requested Prescriptions     Pending Prescriptions Disp Refills    meloxicam (MOBIC) 15 MG tablet 90 tablet 0     Sig: Take 1 tablet by mouth Daily.        [facility], [address] [phone] [facility] [phone] FX     Last office visit with prescribing clinician: 1/15/2024   Last telemedicine visit with prescribing clinician: Visit date not found   Next office visit with prescribing clinician: 11/25/2024     Additional details provided by patient: PATIENT  HAS A COUPLE OF WEEKS LEFT    Does the patient have less than a 3 day supply:  [] Yes  [x] No      Anabell Hernandez Rep   11/18/24 13:50 EST

## 2024-11-22 NOTE — PROGRESS NOTES
Chief Complaint  Shoulder Pain    History of Present Illness  Crista Chacon presents today for new onset concern of shoulder pain      Shoulder Pain: Patient complaints of right shoulder pain. This is evaluated as a personal injury. The pain is described as aching and pulling .  The onset of the pain was sudden, starting about 3 weeks ago.  The pain occurs when active. No history of dislocation. Symptoms are aggravated by reaching, lifting. Symptoms are diminished by none.   Limited activities include: work at or above shoulder height, reaching across body. No stiffness, no weakness is reported. 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    Influenza immunization was not given due to patient refusal.   History of Present Illness  The patient is a 59-year-old female who presents for evaluation of right shoulder pain.    She has been experiencing right shoulder pain for the past 1 to 2 months, which is particularly noticeable when removing her shirt. The pain is not constant and is only present during certain movements. She has not yet tried any treatments such as heat, ice, or topical applications. She is currently taking Meloxicam daily for back pain, which she believes may also be helpful with her shoulder pain. She reports no numbness, burning, or tingling sensations, and has not experienced any instances of dropping objects.     She recalls a similar issue with her shoulder in 2018, which was successfully treated with an injection. (She had recollection that it was the same, right, shoulder but records indicate it was on the left)      Patient Care Team:  Chandni Ron MD as PCP - General (Family Medicine)   Current Outpatient Medications on File Prior to Visit   Medication Sig    Garlic 500 MG capsule Take 1 capsule by mouth Daily.    meloxicam (MOBIC) 15 MG tablet TAKE ONE TABLET BY MOUTH ONCE DAILY    Multiple Vitamins-Minerals (CENTRUM SILVER 50+WOMEN PO) Take 1 tablet by  "mouth Daily.    VITAMIN B COMPLEX-C PO Take 1 capsule by mouth Daily.     No current facility-administered medications on file prior to visit.       Objective   Vital Signs:   /72 (BP Location: Right arm, Patient Position: Sitting, Cuff Size: Adult)   Pulse 102   Temp 97.5 °F (36.4 °C) (Temporal)   Resp 18   Ht 157.5 cm (62\")   Wt 54.9 kg (121 lb)   SpO2 97%   BMI 22.13 kg/m²    BP Readings from Last 3 Encounters:   11/25/24 122/72   01/15/24 110/58   04/24/23 130/70     Wt Readings from Last 3 Encounters:   11/25/24 54.9 kg (121 lb)   01/15/24 56.2 kg (123 lb 12.8 oz)   04/24/23 55.6 kg (122 lb 8 oz)         Physical Exam  Vitals and nursing note reviewed.   Constitutional:       General: She is not in acute distress.     Appearance: Normal appearance. She is well-developed and normal weight. She is not ill-appearing.   HENT:      Head: Normocephalic and atraumatic.   Cardiovascular:      Rate and Rhythm: Normal rate and regular rhythm.      Heart sounds: No murmur heard.  Pulmonary:      Effort: Pulmonary effort is normal.      Breath sounds: Normal breath sounds. No wheezing.   Musculoskeletal:         General: Normal range of motion.      Comments: There is no tenderness palpation of the cervical or upper thoracic spine or paraspinous muscles.  No tenderness to palpation of the posterior shoulder or AC joint negative impingement testing negative Tinel's and Phalen's testing at the wrist and negative Tinel's at the elbow.  There is discomfort to crossing her arm in front of her chest and there is point tenderness to palpation of the lower anterior shoulder at the proximal insertion of the biceps tendon.   Skin:     General: Skin is warm and dry.      Findings: No rash.   Neurological:      Mental Status: She is alert and oriented to person, place, and time.   Psychiatric:         Mood and Affect: Mood normal.         Behavior: Behavior normal.         Thought Content: Thought content normal.      "     Physical Exam        No visits with results within 1 Day(s) from this visit.   Latest known visit with results is:   Office Visit on 01/15/2024   Component Date Value Ref Range Status    Diagnosis 01/15/2024 Comment   Final    NEGATIVE FOR INTRAEPITHELIAL LESION OR MALIGNANCY.    Specimen adequacy: 01/15/2024 Comment   Final    Satisfactory for evaluation. No endocervical component is identified.    Clinician Provided ICD-10: 01/15/2024 Comment   Final    Z12.4    Performed by: 01/15/2024 Comment   Final    Jerome Cerrato, Cytotechnologist (ASCP)    . 01/15/2024 .   Final    Note: 01/15/2024 Comment   Final    Comment: The Pap smear is a screening test designed to aid in the detection of  premalignant and malignant conditions of the uterine cervix.  It is not a  diagnostic procedure and should not be used as the sole means of detecting  cervical cancer.  Both false-positive and false-negative reports do occur.      Method: 01/15/2024 Comment   Final    Comment: This liquid based ThinPrep(R) pap test was screened with the  use of an image guided system.      HPV Aptima 01/15/2024 Negative  Negative Final    Comment: This nucleic acid amplification test detects fourteen high-risk  HPV types (16,18,31,33,35,39,45,51,52,56,58,59,66,68) without  differentiation.      HPV Genotype Reflex 01/15/2024 Comment   Final    Criteria not met, HPV Genotype not performed.    Chlamydia, Nuc. Acid Amp 01/15/2024 Negative  Negative Final    Gonococcus by Nucleic Acid Amp 01/15/2024 Negative  Negative Final    Glucose 01/15/2024 94  70 - 99 mg/dL Final    BUN 01/15/2024 14  6 - 24 mg/dL Final    Creatinine 01/15/2024 0.64  0.57 - 1.00 mg/dL Final    EGFR Result 01/15/2024 103  >59 mL/min/1.73 Final    BUN/Creatinine Ratio 01/15/2024 22  9 - 23 Final    Sodium 01/15/2024 140  134 - 144 mmol/L Final    Potassium 01/15/2024 5.1  3.5 - 5.2 mmol/L Final    Chloride 01/15/2024 99  96 - 106 mmol/L Final    Total CO2 01/15/2024 27  20 - 29  "mmol/L Final    Calcium 01/15/2024 10.1  8.7 - 10.2 mg/dL Final    Total Protein 01/15/2024 7.4  6.0 - 8.5 g/dL Final    Albumin 01/15/2024 4.9  3.8 - 4.9 g/dL Final    Globulin 01/15/2024 2.5  1.5 - 4.5 g/dL Final    A/G Ratio 01/15/2024 2.0  1.2 - 2.2 Final    Total Bilirubin 01/15/2024 0.4  0.0 - 1.2 mg/dL Final    Alkaline Phosphatase 01/15/2024 118  44 - 121 IU/L Final    AST (SGOT) 01/15/2024 16  0 - 40 IU/L Final    ALT (SGPT) 01/15/2024 18  0 - 32 IU/L Final    Total Cholesterol 01/15/2024 256 (H)  100 - 199 mg/dL Final    Triglycerides 01/15/2024 123  0 - 149 mg/dL Final    HDL Cholesterol 01/15/2024 64  >39 mg/dL Final    VLDL Cholesterol Akbar 01/15/2024 22  5 - 40 mg/dL Final    LDL Chol Calc (NIH) 01/15/2024 170 (H)  0 - 99 mg/dL Final       Lab Results   Component Value Date    CHLPL 256 (H) 01/15/2024    TRIG 123 01/15/2024    HDL 64 01/15/2024     (H) 01/15/2024     No results found for: \"TSH\", \"F8ZXXBD\", \"X0RHEAK\", \"THYROIDAB\"  Lab Results   Component Value Date    GLUCOSE 94 01/15/2024    BUN 14 01/15/2024    CREATININE 0.64 01/15/2024    BCR 22 01/15/2024    K 5.1 01/15/2024    CO2 27 01/15/2024    CALCIUM 10.1 01/15/2024    PROTENTOTREF 7.4 01/15/2024    ALBUMIN 4.9 01/15/2024    LABIL2 2.0 01/15/2024    AST 16 01/15/2024    ALT 18 01/15/2024     Lab Results   Component Value Date    WBC 9.1 04/24/2023    HGB 14.2 04/24/2023    HCT 43.1 04/24/2023    MCV 92 04/24/2023     04/24/2023             Results               Assessment and Plan    Diagnoses and all orders for this visit:    1. Right shoulder tendonitis (Primary)  -     Ambulatory Referral to Orthopedic Surgery    2. Acute pain of right shoulder  -     Ambulatory Referral to Orthopedic Surgery    3. Current smoker        Assessment & Plan  1. Right biceps tendinitis.  Symptoms and physical examination findings are consistent with right biceps tendinitis. She was advised to avoid heavy lifting, shoveling or any other activities " that aggravate the pain.  She will continue meloxicam 15 mg daily.  Additionally topical analgesics such as Biofreeze with lidocaine were recommended for pain management. A referral was made for a possible steroid injection, at her request I have placed this as an urgent referral as she is off work this week but  have another 3 weeks of very strenuous work before the end of the season and the pain has been significantly limiting. Handouts detailing exercises and stretches to perform and activities to avoid were provided. Pendulum exercises were suggested to prevent frozen shoulder.      There are no discontinued medications.      Follow Up     Return in 8 weeks (on 1/20/2025) for Annual physical, as previously scheduled.    Patient was given instructions and counseling regarding her condition or for health maintenance advice. Please see specific information pulled into the AVS if appropriate.     Patient or patient representative verbalized consent for the use of Ambient Listening during the visit with  Chandni Ron MD for chart documentation. 11/25/2024  13:28 EST

## 2024-11-25 ENCOUNTER — OFFICE VISIT (OUTPATIENT)
Dept: FAMILY MEDICINE CLINIC | Facility: CLINIC | Age: 59
End: 2024-11-25
Payer: COMMERCIAL

## 2024-11-25 VITALS
RESPIRATION RATE: 18 BRPM | TEMPERATURE: 97.5 F | DIASTOLIC BLOOD PRESSURE: 72 MMHG | SYSTOLIC BLOOD PRESSURE: 122 MMHG | BODY MASS INDEX: 22.26 KG/M2 | HEART RATE: 102 BPM | OXYGEN SATURATION: 97 % | HEIGHT: 62 IN | WEIGHT: 121 LBS

## 2024-11-25 DIAGNOSIS — M25.511 ACUTE PAIN OF RIGHT SHOULDER: ICD-10-CM

## 2024-11-25 DIAGNOSIS — M77.8 RIGHT SHOULDER TENDONITIS: Primary | ICD-10-CM

## 2024-11-25 DIAGNOSIS — F17.200 CURRENT SMOKER: ICD-10-CM

## 2024-11-25 PROCEDURE — 99213 OFFICE O/P EST LOW 20 MIN: CPT | Performed by: FAMILY MEDICINE

## 2025-01-17 NOTE — PROGRESS NOTES
Chief Complaint   Patient presents with    Annual Exam       History of Present Illness  Subjective   Crista Chacon is a 59 y.o. female.     History of Present Illness  The patient is a 59-year-old female here for an annual physical exam.    She is not due for a Pap smear, having had a normal one in January 2024. Following her last appointment,  in November she had labs done, which showed a slight elevation in cholesterol. Her CMP and CBC were completely within normal limits.    Patient's last colonoscopy was with Dr. Finley in [place] on 3/15/2021  The patient is here: for coordination of medical care.  Last comprehensive physical was on 1/15/24.  Previous physical was performed by  Chandni Ron MD  Overall has: moderate activity with work/home activities, exercises 2 - 3 times per week, good appetite, feels well with no complaints, good energy level, and is sleeping well. Nutrition: balanced diet. Last tetanus shot was  12/9/21 . Patient is doing routine self breast exams: monthly    Health Maintenance   Topic Date Due    HEPATITIS C SCREENING  Never done    ANNUAL PHYSICAL  01/15/2025    ZOSTER VACCINE (1 of 2) 01/20/2025 (Originally 5/16/2016)    COVID-19 Vaccine (1 - 2024-25 season) 01/22/2025 (Originally 9/1/2024)    INFLUENZA VACCINE  03/31/2025 (Originally 7/1/2024)    MAMMOGRAM  01/20/2026 (Originally 5/16/2006)    Pneumococcal Vaccine 0-64 (1 of 2 - PCV) 05/16/2031 (Originally [birth year]/16/1972)    COLORECTAL CANCER SCREENING  03/15/2026    PAP SMEAR  01/15/2027    TDAP/TD VACCINES (2 - Td or Tdap) 12/09/2031       PHQ-9 Depression Screening  Little interest or pleasure in doing things? Not at all   Feeling down, depressed, or hopeless? Not at all   Trouble falling or staying asleep, or sleeping too much?     Feeling tired or having little energy?     Poor appetite or overeating?     Feeling bad about yourself - or that you are a failure or have let yourself or your family down?     Trouble  concentrating on things, such as reading the newspaper or watching television?     Moving or speaking so slowly that other people could have noticed? Or the opposite - being so fidgety or restless that you have been moving around a lot more than usual?     Thoughts that you would be better off dead, or of hurting yourself in some way?     PHQ-9 Total Score     If you checked off any problems, how difficult have these problems made it for you to do your work, take care of things at home, or get along with other people?               Recent Hospitalizations:  No hospitalization(s) within the last year..  ccc      I personally reviewed and updated the patient's allergies, medications, problem list, and past medical, surgical, social, and family history. I have reviewed and confirmed the accuracy of the HPI and ROS as documented by the MA/LPN/RN Chandni Ron MD    Family History   Problem Relation Age of Onset    Hypertension Mother     Ovarian cancer Maternal Grandmother     Hypertension Maternal Grandfather     Alcohol abuse Maternal Grandfather     Stroke Maternal Grandfather        Social History     Tobacco Use    Smoking status: Some Days     Current packs/day: 0.25     Average packs/day: 0.3 packs/day for 43.1 years (10.8 ttl pk-yrs)     Types: Cigarettes     Start date: 1/1/1982    Smokeless tobacco: Never   Vaping Use    Vaping status: Never Used   Substance Use Topics    Alcohol use: No    Drug use: No       Past Surgical History:   Procedure Laterality Date    BREAST BIOPSY  04/08/2015    LAPAROSCOPIC TUBAL LIGATION  1995    PAP SMEAR  03/12/2015       Patient Active Problem List   Diagnosis    Anal itching    Arthritis    Breast mass    Chronic low back pain    Elevated blood pressure reading without diagnosis of hypertension    Encounter for other administrative examinations    Encounter for immunization    Snoring    Syncope    Tobacco dependence syndrome    Vaginitis and vulvovaginitis     "Floaters in visual field, bilateral    Shoulder arthritis    DDD (degenerative disc disease), lumbar         Current Outpatient Medications:     Garlic 500 MG capsule, Take 1 capsule by mouth Daily., Disp: , Rfl:     meloxicam (MOBIC) 15 MG tablet, Take 1 tablet by mouth Daily., Disp: 90 tablet, Rfl: 3    Multiple Vitamins-Minerals (CENTRUM SILVER 50+WOMEN PO), Take 1 tablet by mouth Daily., Disp: , Rfl:     VITAMIN B COMPLEX-C PO, Take 1 capsule by mouth Daily., Disp: , Rfl:     Objective   /84 (BP Location: Right arm, Patient Position: Sitting, Cuff Size: Adult)   Pulse 88   Temp 98.2 °F (36.8 °C) (Temporal)   Resp 18   Ht 156.2 cm (61.5\")   Wt 54.1 kg (119 lb 3.2 oz)   SpO2 96%   BMI 22.16 kg/m²   BP Readings from Last 3 Encounters:   01/20/25 122/84   11/25/24 122/72   01/15/24 110/58     Wt Readings from Last 3 Encounters:   01/20/25 54.1 kg (119 lb 3.2 oz)   11/25/24 54.9 kg (121 lb)   01/15/24 56.2 kg (123 lb 12.8 oz)     Physical Exam  Vitals and nursing note reviewed.   Constitutional:       General: She is not in acute distress.     Appearance: Normal appearance. She is well-developed and normal weight.   HENT:      Head: Normocephalic and atraumatic.      Right Ear: Tympanic membrane, ear canal and external ear normal. There is no impacted cerumen.      Left Ear: Tympanic membrane, ear canal and external ear normal. There is no impacted cerumen.      Nose: Nose normal.      Mouth/Throat:      Mouth: Mucous membranes are moist.      Pharynx: Oropharynx is clear. No oropharyngeal exudate or posterior oropharyngeal erythema.   Eyes:      Conjunctiva/sclera: Conjunctivae normal.      Pupils: Pupils are equal, round, and reactive to light.   Neck:      Thyroid: No thyromegaly.      Vascular: No JVD.   Cardiovascular:      Rate and Rhythm: Normal rate and regular rhythm.      Heart sounds: Normal heart sounds. No murmur heard.  Pulmonary:      Breath sounds: Normal breath sounds. No wheezing or " "rales.      Comments: Breasts: breasts appear normal, no suspicious masses, no skin or nipple changes or axillary nodes    Abdominal:      General: Abdomen is flat. Bowel sounds are normal. There is no distension.      Palpations: Abdomen is soft.      Tenderness: There is no abdominal tenderness.   Musculoskeletal:         General: No swelling. Normal range of motion.      Cervical back: Normal range of motion.   Lymphadenopathy:      Cervical: No cervical adenopathy.   Skin:     General: Skin is warm and dry.      Findings: No rash.      Comments:  numerous small pale-tan and red nevi on skin 1 darker lesion on right anterior chest 5 larger lesions on the back consistent with seborrheic keratoses   Neurological:      Mental Status: She is alert and oriented to person, place, and time.   Psychiatric:         Mood and Affect: Mood normal.         Behavior: Behavior normal.         Physical Exam      Data / Lab Results:  No results found for: \"HGBA1C\"    The 10-year ASCVD risk score (Chuy ESTRADA, et al., 2019) is: 5.3%    Values used to calculate the score:      Age: 59 years      Sex: Female      Is Non- : No      Diabetic: No      Tobacco smoker: Yes      Systolic Blood Pressure: 122 mmHg      Is BP treated: No      HDL Cholesterol: 66 mg/dL      Total Cholesterol: 227 mg/dL    Results  Laboratory Studies  Cholesterol was slightly elevated. CMP and CBC were within normal limits. Cholesterol decreased from 256 to 227. LDL decreased from 170 to 150.      Colonoscopy Dr. Hobson 3/15/2021 found one 10 mm polyp, fragments of a tubular adenoma, negative for high grade dysplasia or malignancy.    Age-appropriate Screening Schedule:  Refer to the list below for future screening recommendations based on patient's age, sex and/or medical conditions. Orders for these recommended tests are listed in the plan section. The patient has been provided with a written plan.        Assessment & Plan      " Medications        Problem List         LOS        Diagnoses and all orders for this visit:    1. Annual physical exam (Primary)    2. Current smoker    3. DDD (degenerative disc disease), lumbar  -     meloxicam (MOBIC) 15 MG tablet; Take 1 tablet by mouth Daily.  Dispense: 90 tablet; Refill: 3    4. Chronic bilateral low back pain without sciatica  -     meloxicam (MOBIC) 15 MG tablet; Take 1 tablet by mouth Daily.  Dispense: 90 tablet; Refill: 3    5. Right shoulder tendonitis    6. Shoulder arthritis    7. Seborrheic keratoses        Assessment & Plan  1. Annual physical examination.  Her Pap smear conducted last year yielded normal results, negating the need for a repeat test until 2 years from now. She has declined the offer for a mammogram at this time. Her colonoscopy is due in 2026. The presence of seborrheic keratoses on her skin does not raise any immediate concerns. She has been advised to continue self-breast examinations and to report any abnormalities promptly. She has been encouraged to consider the shingles vaccine, despite her lack of history with chickenpox.  She has previously declined and continues to decline influenza and COVID vaccinations.  A hepatitis C screening will be ordered during her next lab visit. She has been informed about the potential benefits and risks associated with natural remedies and has been directed to the NIH website for further information.    2. Cholesterol management.  Her cholesterol levels have improved compared to the previous year, with a decrease from 256 to 227. However, her LDL levels remain suboptimal at 150, which is above the desired level of 100.  Do recommend low cholesterol diet and regular physical activity but given her overall risk profile, the initiation of statin therapy is not recommended at this time.    3. Arthritic pain.  Last visit patient was referred to Ortho for injection in her right shoulder from shoulder tendinitis right shoulder pain has  completely resolved at this time.  She experienced adverse effects with Celebrex, including heart racing and headaches, leading to discontinuation of the medication. A prescription for a 90-day supply of meloxicam, with 3 refills, has been sent to [facility] mail order pharmacy.  This is to be used on an as-needed basis for arthritic pains.    Follow-up  The patient will follow up in 1 year for her annual physical examination and to reassess her arthritic pain.    PROCEDURE  Colonoscopy was performed on 03/15/2021, revealing one 10 mm polyp.    The patient was counseled regarding nutrition, physical activity, healthy weight, injury prevention, immunizations and preventative health screenings.      Return in about 1 year (around 1/20/2026) for Annual physical recheck arthritc pain/Meloxicam and labs.      Expected course, medications, and adverse effects discussed.    Call or return if worsening or persistent symptoms.   Hand hygiene was performed before and after exam.   The complete contents of the Assessment and Plan and Data / Lab Results as documented above have been reviewed and addressed by myself with the patient today as part of an ongoing evaluation / treatment plan.    If separate E/M service has been provided today it was significant, medically necessary, and separately identifiable.           Patient or patient representative verbalized consent for the use of Ambient Listening during the visit with  Chandni Ron MD for chart documentation. 1/20/2025  10:13 EST

## 2025-01-20 ENCOUNTER — TELEPHONE (OUTPATIENT)
Dept: FAMILY MEDICINE CLINIC | Facility: CLINIC | Age: 60
End: 2025-01-20

## 2025-01-20 ENCOUNTER — OFFICE VISIT (OUTPATIENT)
Dept: FAMILY MEDICINE CLINIC | Facility: CLINIC | Age: 60
End: 2025-01-20
Payer: COMMERCIAL

## 2025-01-20 VITALS
RESPIRATION RATE: 18 BRPM | WEIGHT: 119.2 LBS | TEMPERATURE: 98.2 F | HEIGHT: 62 IN | OXYGEN SATURATION: 96 % | SYSTOLIC BLOOD PRESSURE: 122 MMHG | BODY MASS INDEX: 21.94 KG/M2 | HEART RATE: 88 BPM | DIASTOLIC BLOOD PRESSURE: 84 MMHG

## 2025-01-20 DIAGNOSIS — Z00.00 ANNUAL PHYSICAL EXAM: Primary | ICD-10-CM

## 2025-01-20 DIAGNOSIS — M51.369 DDD (DEGENERATIVE DISC DISEASE), LUMBAR: ICD-10-CM

## 2025-01-20 DIAGNOSIS — G89.29 CHRONIC BILATERAL LOW BACK PAIN WITHOUT SCIATICA: ICD-10-CM

## 2025-01-20 DIAGNOSIS — M54.50 CHRONIC BILATERAL LOW BACK PAIN WITHOUT SCIATICA: ICD-10-CM

## 2025-01-20 DIAGNOSIS — M19.019 SHOULDER ARTHRITIS: ICD-10-CM

## 2025-01-20 DIAGNOSIS — M77.8 RIGHT SHOULDER TENDONITIS: ICD-10-CM

## 2025-01-20 DIAGNOSIS — F17.200 CURRENT SMOKER: ICD-10-CM

## 2025-01-20 DIAGNOSIS — L82.1 SEBORRHEIC KERATOSES: ICD-10-CM

## 2025-01-20 PROCEDURE — 99213 OFFICE O/P EST LOW 20 MIN: CPT | Performed by: FAMILY MEDICINE

## 2025-01-20 PROCEDURE — 99396 PREV VISIT EST AGE 40-64: CPT | Performed by: FAMILY MEDICINE

## 2025-01-20 RX ORDER — MELOXICAM 15 MG/1
15 TABLET ORAL DAILY
Qty: 90 TABLET | Refills: 3 | Status: SHIPPED | OUTPATIENT
Start: 2025-01-20

## 2025-01-20 NOTE — TELEPHONE ENCOUNTER
Spoke with evelio at [facility] to see when patient needs to repeat colonoscopy as it was last done in 2021. Evelio confirmed the patient will be due again in 2026.

## 2025-02-27 DIAGNOSIS — G89.29 CHRONIC BILATERAL LOW BACK PAIN WITHOUT SCIATICA: ICD-10-CM

## 2025-02-27 DIAGNOSIS — M51.369 DDD (DEGENERATIVE DISC DISEASE), LUMBAR: ICD-10-CM

## 2025-02-27 DIAGNOSIS — M54.50 CHRONIC BILATERAL LOW BACK PAIN WITHOUT SCIATICA: ICD-10-CM

## 2025-02-27 NOTE — TELEPHONE ENCOUNTER
Caller: Juanita Chaconalexis Griffin    Relationship: Self    Best call back number: [phone]     Requested Prescriptions:   Requested Prescriptions     Pending Prescriptions Disp Refills    meloxicam (MOBIC) 15 MG tablet 90 tablet 3     Sig: Take 1 tablet by mouth Daily.        [facility], [address] [phone] [facility] [phone] FX     Last office visit with prescribing clinician: 1/20/2025   Last telemedicine visit with prescribing clinician: Visit date not found   Next office visit with prescribing clinician: Visit date not found     Additional details provided by patient:     Does the patient have less than a 3 day supply:  [] Yes  [x] No    Would you like a call back once the refill request has been completed: [] Yes [] No    If the office needs to give you a call back, can they leave a voicemail: [] Yes [] No    Anabell Cervantes Rep   02/27/25 11:14 EST

## 2025-02-28 RX ORDER — MELOXICAM 15 MG/1
15 TABLET ORAL DAILY
Qty: 90 TABLET | Refills: 3 | Status: SHIPPED | OUTPATIENT
Start: 2025-02-28